# Patient Record
Sex: FEMALE | NOT HISPANIC OR LATINO | Employment: FULL TIME
[De-identification: names, ages, dates, MRNs, and addresses within clinical notes are randomized per-mention and may not be internally consistent; named-entity substitution may affect disease eponyms.]

---

## 2024-01-29 ENCOUNTER — TRANSCRIBE ORDERS (OUTPATIENT)
Dept: OTHER | Age: 44
End: 2024-01-29

## 2024-01-29 DIAGNOSIS — E66.01 MORBID OBESITY (H): Primary | ICD-10-CM

## 2024-02-07 ENCOUNTER — VIRTUAL VISIT (OUTPATIENT)
Dept: ENDOCRINOLOGY | Facility: CLINIC | Age: 44
End: 2024-02-07
Payer: COMMERCIAL

## 2024-02-07 VITALS — WEIGHT: 215 LBS | HEIGHT: 65 IN | BODY MASS INDEX: 35.82 KG/M2

## 2024-02-07 DIAGNOSIS — E66.01 MORBID OBESITY (H): ICD-10-CM

## 2024-02-07 DIAGNOSIS — E66.812 CLASS 2 SEVERE OBESITY WITH SERIOUS COMORBIDITY AND BODY MASS INDEX (BMI) OF 35.0 TO 35.9 IN ADULT, UNSPECIFIED OBESITY TYPE (H): Primary | ICD-10-CM

## 2024-02-07 DIAGNOSIS — E66.01 CLASS 2 SEVERE OBESITY WITH SERIOUS COMORBIDITY AND BODY MASS INDEX (BMI) OF 35.0 TO 35.9 IN ADULT, UNSPECIFIED OBESITY TYPE (H): Primary | ICD-10-CM

## 2024-02-07 DIAGNOSIS — Z98.84 S/P GASTRIC BYPASS: ICD-10-CM

## 2024-02-07 PROCEDURE — 99417 PROLNG OP E/M EACH 15 MIN: CPT | Mod: 95

## 2024-02-07 PROCEDURE — 99205 OFFICE O/P NEW HI 60 MIN: CPT | Mod: 95

## 2024-02-07 RX ORDER — LISINOPRIL 20 MG/1
20 TABLET ORAL DAILY
COMMUNITY

## 2024-02-07 RX ORDER — CHLORAL HYDRATE 500 MG
1000 CAPSULE ORAL DAILY
COMMUNITY

## 2024-02-07 RX ORDER — THIAMINE MONONITRATE (VIT B1) 100 MG
100 TABLET ORAL DAILY
COMMUNITY

## 2024-02-07 NOTE — PROGRESS NOTES
"109 minutes spent by me on the date of the encounter doing chart review, history and exam, documentation and further activities per the note    New Medical Weight Management Consult    PATIENT:  Becky Barrios  MRN:         [id number]  :         1980  MAGED:         2024    Dear Alba Sharma ,    I had the pleasure of seeing your patient, Becky Barrios. Full intake/assessment was done to determine barriers to weight loss success and develop a treatment plan. Becky Barrios is a 43 year old female interested in treatment of medical problems associated with excess weight. She has a height of 5' 5\", a weight of 215 lbs 0 oz, and the calculated Body mass index is 35.78 kg/m .            Assessment & Plan   Problem List Items Addressed This Visit       Class 2 severe obesity with serious comorbidity and body mass index (BMI) of 35.0 to 35.9 in adult, unspecified obesity type (H) - Primary    Relevant Medications    tirzepatide-Weight Management (ZEPBOUND) 2.5 MG/0.5ML prefilled pen    tirzepatide-Weight Management (ZEPBOUND) 5 MG/0.5ML prefilled pen (Start on 3/7/2024)    Other Relevant Orders    CBC with platelets    Comprehensive metabolic panel    Hemoglobin A1c    Lipid panel reflex to direct LDL Fasting    Parathyroid Hormone Intact    Vitamin A    Vitamin B12    Vitamin D Deficiency    S/P gastric bypass    Relevant Medications    tirzepatide-Weight Management (ZEPBOUND) 2.5 MG/0.5ML prefilled pen    tirzepatide-Weight Management (ZEPBOUND) 5 MG/0.5ML prefilled pen (Start on 3/7/2024)    Other Relevant Orders    CBC with platelets    Comprehensive metabolic panel    Hemoglobin A1c    Lipid panel reflex to direct LDL Fasting    Parathyroid Hormone Intact    Vitamin A    Vitamin B12    Vitamin D Deficiency     Other Visit Diagnoses       Morbid obesity (H)        Relevant Medications    tirzepatide-Weight Management (ZEPBOUND) 2.5 MG/0.5ML prefilled pen    tirzepatide-Weight " Management (ZEPBOUND) 5 MG/0.5ML prefilled pen (Start on 3/7/2024)           Overweight onset in high school, but saw more significant weight gain in college. Age 16 weighed approximately 155lbs. Throughout college gained  up to 200, continued to gain up to 255, which was highest weight in life.     Was diagnosed with type 2 diabetes in 2008, but blood sugar has not reached diabetes levels since her RYGB.   Had a RYGB in 2009 through [facility].  Ihsan weight after RYGB was 155 lbs. Has gradually regained this weight over the last 15 years, up to current weight of 215lbs which is her highest weight since surgery.     Regarding any issues post RYGB: Experiences loose stools after eating high carbohydrate meals since surgery, sometimes stools are loose even if the meal wasn't high in carbs or sugar.   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 She ate a piece of candy and felt better afterwards. Has experienced other episodes dizziness and fatigue after not eating for long stretches of time, has not checked her blood sugar, describes that none were worrisome to her aside from that one episode as an ED nurse.   Had one episode of severe abdominal pain after eating a very large amount of food 7-8 years ago that lead to a brief hospitalization and management through [facility] due to concern for a bowel obstruction , was monitored over night with close monitoring and was eventually able to go home without surgical intervention, the presumed diagnosis was related to food imaction. Aside from these aforementioned issues, has not seen any other issues related to the RYGB (denies dysphagia, nausea, abdominal pain).  Labs and post bariatric vitamins managed annually through PCP Alba Sharma in [place].     Aside from RYGB, at times has been able to lose 10lbs in the past with  increased exercise but this weight will come back once exercise stopped, Becky struggles to exercise lately due to frequent ankle pain.      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     Comorbidities associated with weight gain include high blood pressure, fatty liver, GERD (occasionally, managed as needed with tums), back pain, ankle pain, history of elevated cholesterol, history of type 2 diabetes diagnosis.     Motivators for weight loss include improve overall health, be able to live as long as possible to be around for her kids.     She is interested in starting a medication as a tool for working towards sustainable weight loss.    Regarding eating patterns and diet,  she typically eats 2 meals a day. Craves pasta and bread, experiences food noise throughout the day. Is able to get full. Can stay full. Eats out/ gets take out a few times a week. Drinks diet energy drink, coffee with splenda and cream, diet coke once a week.    First meal, 10:30 or 11:00 - will eat leftovers from supper, doughnuts in the office. Sometimes will eat a cheese stick. Typically eats alone, doesn't like people watching her eat.   Second meal around 5:00 - supper. Hamburger, chicken, fish, or steak; mashed potato, baked potatos, pasta; vegetables   After supper might have a bite of food but otherwise doesn't typically eat  Seldomly wakes up in the middle of the night to eat (maybe once every few months).     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 Has frequent ankle pain (foot surgery 20 years ago related to this), makes it harder to walk. Likes walking generally, hopes to take walk breaks at work as her work  encourages her to do this.         Past/ Current AOMs  Has taken wellbutrin in the past for mood, reports that she's tolerated it in the past and stopped when her mood was better.     Plan   Start Zepbound 2.5mg once weekly for 4 weeks, then increase to 5mg once weekly.  Alternatives discussed: topiramate, contrave, metformin. If Becky is able to get documentation showing she has a previous diagnosis of type 2 diabetes, could consider ozempic in the future   Goals we discussed today: going on walks in the middle of the day while at work   Labs ordered today: post rygb labs faxed to [facility]  Follow up with Adilene in 1 month to check in on starting zepbound    Dietician appointment to be scheduled asap        Anti-obesity medication started today for this patient   ZEPBOUND  No history of pancreatitis   No personal or family history of medullary thyroid carcinoma  No personal or family history of Multiple Endocrine Neoplasia Type 2  Will likely see the most benefit in managing cravings, food noise, helping with reducing snacking throughout the day   .     Potential anti-obesity medications for this patient the future if there are issues with cost or side effects  OZEMPIC  Was diagnosed with diabetes mellitus type 2 about 15 years ago, prior to RYGB. With documentation of this, could prescribe with this diagnosis.   No history of pancreatitis   No personal or family history of medullary thyroid carcinoma  No personal or family history of Multiple Endocrine Neoplasia Type 2  .  TOPIRAMATE  No history of kidney stones  No history of glaucoma   No issues with memory  No chronic kidney disease   .  CONTRAVE  No history of liver disease  No chronic pain  No history of bipolar disorder  No history of cardiovascular disease   No history of cardiac arrhythmias   No history of seizures  No history of bulimia nervosa  No history of anorexia nervosa  .  METFORMIN  Has taken in the past , does not recall adverse side  "effects   No history of chronic kidney disease  GFR >45  .    The following medications could be considered with caution for this patient   PHENTERMINE  No history of CVA   No history of cardiovascular disease   No history of cardiac arrhythmias   No history of glaucoma  No history of drug abuse  Would need documentation of optimally controlled blood pressure   .         Patient instructed that 2 forms of birth control are required with topiramate, discussed that these medications are not safe in pregnancy.           Becky Barrios is a 43 year old female who presents to clinic today for the following health issues.     She has the following co-morbidities:        2/7/2024     2:26 PM   --   I have the following health issues associated with obesity Pre-Diabetes    High Blood Pressure    High Cholesterol    GERD (Reflux)    Fatty Liver    Stress Incontinence   I have the following symptoms associated with obesity Knee Pain    Infertility (Difficulty Getting Pregnant)    Depression    Lower Extremity Swelling    Back Pain    Fatigue    Hip Pain    Irregular Menstral Cycle            No data to display                    2/7/2024     2:26 PM   Referring Provider   Please name the provider who referred you to Medical Weight Management  If you do not know, please answer \"I Don't Know\" Alba chavez           2/7/2024     2:26 PM   Weight History   How concerned are you about your weight? Very Concerned   I became overweight As a Teenager   The following factors have contributed to my weight gain Mental Health Issues    Change in Schedule    Eating Wrong Types of Food    Eating Too Much    Lack of Exercise    Genetic (Runs in the Family)    Stress   I have tried the following methods to lose weight Exercise    Slim Fast or Other Liquid Diets    Medications    Weight Loss Surgery    Meal Replacements   My lowest weight since age 16 was 165   My highest weight since age 16 was 275   The most weight I have ever " lost was (lbs) 100   I have the following family history of obesity/being overweight My mother is overweight    One or more of my siblings are overweight    Many of my relatives are overweight   How has your weight changed over the last year? Gained   How many pounds? 25           2/7/2024     2:26 PM   Diet Recall Review with Patient   If you do eat lunch, what types of food do you typically eat? Pasta bread   If you do eat supper, what types of food do you typically eat? Meat and sides   If you do snack, what types of food do you typically eat? Crackers fruit vegetable   How many glasses of juice do you drink in a typical day? 1   How many of glasses of milk do you drink in a typical day? 1   If you do drink milk, what type? 2%   How many 8oz glasses of sugar containing drinks such as Pierre-Aid/sweet tea do you drink in a day? 2   How many cans/bottles of sugar pop/soda/tea/sports drinks do you drink in a day? 0   How many cans/bottles of diet pop/soda/tea or sports drink do you drink in a day? 1   How often do you have a drink of alcohol? Monthly or Less   If you do drink, how many drinks might you have in a day? 1 or 2           2/7/2024     2:26 PM   Eating Habits   Generally, my meals include foods like these bread, pasta, rice, potatoes, corn, crackers, sweet dessert, pop, or juice Almost Everyday   Generally, my meals include foods like these fried meats, brats, burgers, french fries, pizza, cheese, chips, or ice cream A Few Times a Week   Eat fast food (like McDonalds, Burger Iraj, Taco Bell) Once a Week   Eat at a buffet or sit-down restaurant Never   Eat most of my meals in front of the TV or computer A Few Times a Week   Often skip meals, eat at random times, have no regular eating times A Few Times a Week   Rarely sit down for a meal but snack or graze throughout A Few Times a Week   Eat extra snacks between meals A Few Times a Week   Eat most of my food at the end of the day A Few Times a Week   Eat in  the middle of the night or wake up at night to eat Less Than Weekly   Eat extra snacks to prevent or correct low blood sugar A Few Times a Week   Eat to prevent acid reflux or stomach pain A Few Times a Week   Worry about not having enough food to eat Never   I eat when I am depressed Almost Everyday   I eat when I am stressed Almost Everyday   I eat when I am bored Almost Everyday   I eat when I am anxious Almost Everyday   I eat when I am happy or as a reward Never   I feel hungry all the time even if I just have eaten Never   Feeling full is important to me Never   I finish all the food on my plate even if I am already full Less Than Weekly   I can't resist eating delicious food or walk past the good food/smell Less Than Weekly   I eat/snack without noticing that I am eating Once a Week   I eat when I am preparing the meal Less Than Weekly   I eat more than usual when I see others eating Less Than Weekly   I have trouble not eating sweets, ice cream, cookies, or chips if they are around the house Never   I think about food all day Less Than Weekly   What foods, if any, do you crave? Chips/Crackers           2/7/2024     2:26 PM   Amount of Food   I feel out of control when eating Weekly   I eat a large amount of food, like a loaf of bread, a box of cookies, a pint/quart of ice cream, all at once Weekly   I eat a large amount of food even when I am not hungry Weekly   I eat rapidly Weekly   I eat alone because I feel embarrassed and do not want others to see how much I have eaten Everyday   I eat until I am uncomfortably full Weekly   I feel bad, disgusted, or guilty after I overeat Weekly   Has had episodes in the past of overeating but since the one episode of overeating causing enough abdominal pain to cause her to be hospitalized (see HPI), but since then has been able to avoid overeating due to concern for causing that pain.         2/7/2024     2:26 PM   Activity/Exercise History   How much of a typical 12  hour day do you spend sitting? Half the Day   How much of a typical 12 hour day do you spend lying down? Less Than Half the Day   How much of a typical day do you spend walking/standing? Less Than Half the Day   How many hours (not including work) do you spend on the TV/Video Games/Computer/Tablet/Phone? 1 Hour or Less   How many times a week are you active for the purpose of exercise? 4-5 TImes a Week   What keeps you from being more active? Shortness of Breath    Lack of Time    Too tired   How many total minutes do you spend doing some activity for the purpose of exercising when you exercise? 15-30 Minutes       PAST MEDICAL HISTORY:  No past medical history on file.        2/7/2024     2:26 PM   Work/Social History Reviewed With Patient   My employment status is Full-Time   My job is Nurse   How much of your job is spent on the computer or phone? 75%   How many hours do you spend commuting to work daily? 1   What is your marital status? Single   If you have children, are they overweight? Yes   Who do you live with? Children   Who does the food shopping? I do       Marijuana use: none   Alcohol use: 2-4 times a month (2-4 beers in a sitting), with going out to dinner or in social events   Caffeine use energy drink and coffee daily             2/7/2024     2:26 PM   Mental Health History Reviewed With Patient   Have you ever been physically or sexually abused? No   How often in the past 2 weeks have you felt little interest or pleasure in doing things? For Several Days   Over the past 2 weeks how often have you felt down, depressed, or hopeless? For Several Days             2/7/2024     2:26 PM   Sleep History Reviewed With Patient   How many hours do you sleep at night? 7         MEDICATIONS:   Current Outpatient Medications   Medication Sig Dispense Refill    lisinopril (ZESTRIL) 20 MG tablet Take 20 mg by mouth daily      tirzepatide-Weight Management (ZEPBOUND) 2.5 MG/0.5ML prefilled pen Inject 0.5 mLs (2.5  "mg) Subcutaneous every 7 days For 4 weeks 2 mL 0    [START ON 3/7/2024] tirzepatide-Weight Management (ZEPBOUND) 5 MG/0.5ML prefilled pen Inject 0.5 mLs (5 mg) Subcutaneous every 7 days After completing 4 weeks of taking the 2.5mg dose 2 mL 2    FERROUS FUMARATE-VITAMIN C PO Take 29 mg by mouth daily      fish oil-omega-3 fatty acids 1000 MG capsule Take 1,000 mg by mouth daily      Multiple Vitamins-Minerals (OCUVITE-LUTEIN PO) Take 1 tablet by mouth 2 times daily      Thiamine Mononitrate (VITAMIN B-1) 100 MG TABS Take 100 mg by mouth daily      vitamin B-12 (CYANOCOBALAMIN) 100 MCG tablet Take 100 mcg by mouth daily             ALLERGIES:   No Known Allergies         No data to display                        Objective    Ht 1.651 m (5' 5\")   Wt 97.5 kg (215 lb)   BMI 35.78 kg/m           Vitals:  No vitals were obtained today due to virtual visit.    Physical Exam   GENERAL: alert and no distress  EYES: Eyes grossly normal to inspection.  No discharge or erythema, or obvious scleral/conjunctival abnormalities.  RESP: No audible wheeze, cough, or visible cyanosis.    SKIN: Visible skin clear. No significant rash, abnormal pigmentation or lesions.  NEURO: Cranial nerves grossly intact.  Mentation and speech appropriate for age.  PSYCH: Appropriate affect, tone, and pace of words     Anti-obesity medication ROS:    HEENT  Hx of glaucoma: No    Cardiovascular  CAD:No  HTN:Yes      Gastrointestinal  GERD:Yes seldom, managed with tums   Constipation:No  Liver Dz:Yes fatty liver   H/O Pancreatitis:No    Psychiatric  Bipolar: No  Anxiety:Yes  Depression:Yes more like adjustment disorder   History of alcohol/drug abuse: No  Hx of eating disorder:No    Endocrine  Personal or family hx of MTC or MEN2:No  Diabetes/prediabetes: Yes has been diagnosed with diabetes 15 years ago     Neurologic:  Hx of seizures: No  Hx of migraines: No  Memory Impairment: No  CVA history: No      History of kidney stones: No  Kidney disease: " No  Current birth control: Yes perimenopausal, menstrual cycle has been irregular   Interested in future pregnancy: No    Taking Opioid/Narcotic: No        Sincerely,    Adilene Doran PA-C

## 2024-02-07 NOTE — PROGRESS NOTES
Virtual Visit Check-In    During this virtual visit the patient is located in [place], patient verifies this as the location during the entirety of this visit.     Becky is a 43 year old who is being evaluated via a billable video visit.      How would you like to obtain your AVS? MyChart  If the video visit is dropped, the invitation should be resent by: Text to cell phone: [phone]  Will anyone else be joining your video visit? No        Video-Visit Details    Type of service:  Video Visit     Originating Location (pt. Location): Other parking lot     Distant Location (provider location):  Off-site  Platform used for Video Visit: [employer]         Video Start Time:  2:30pm  Video End Time: 3:25pm    Odalys Conrad, EMT

## 2024-02-07 NOTE — LETTER
"2024       RE: Becky Barrios  [address]  [place] [address]     Dear Colleague,    Thank you for referring your patient, Becky Barrios, to the [facility] [place] [facility]. Please see a copy of my visit note below.    Virtual Visit Check-In    During this virtual visit the patient is located in [place], patient verifies this as the location during the entirety of this visit.     Becky is a 43 year old who is being evaluated via a billable video visit.      How would you like to obtain your AVS? MyChart  If the video visit is dropped, the invitation should be resent by: Text to cell phone: [phone]  Will anyone else be joining your video visit? No        Video-Visit Details    Type of service:  Video Visit     Originating Location (pt. Location): Other parking lot     Distant Location (provider location):  Off-site  Platform used for Video Visit: Zixi         Video Start Time:  2:30pm  Video End Time: 3:25pm    YUSRA Guerrero                109 minutes spent by me on the date of the encounter doing chart review, history and exam, documentation and further activities per the note    New Medical Weight Management Consult    PATIENT:  Becky Barrios  MRN:         [id number]  :         1980  MAGED:         2024    Dear Alba Sharma ,    I had the pleasure of seeing your patient, Becky Barrios. Full intake/assessment was done to determine barriers to weight loss success and develop a treatment plan. Becky Barrios is a 43 year old female interested in treatment of medical problems associated with excess weight. She has a height of 5' 5\", a weight of 215 lbs 0 oz, and the calculated Body mass index is 35.78 kg/m .            Assessment & Plan  Problem List Items Addressed This Visit       Class 2 severe obesity with serious comorbidity and body mass index (BMI) of 35.0 to " 35.9 in adult, unspecified obesity type (H) - Primary    Relevant Medications    tirzepatide-Weight Management (ZEPBOUND) 2.5 MG/0.5ML prefilled pen    tirzepatide-Weight Management (ZEPBOUND) 5 MG/0.5ML prefilled pen (Start on 3/7/2024)    Other Relevant Orders    CBC with platelets    Comprehensive metabolic panel    Hemoglobin A1c    Lipid panel reflex to direct LDL Fasting    Parathyroid Hormone Intact    Vitamin A    Vitamin B12    Vitamin D Deficiency    S/P gastric bypass    Relevant Medications    tirzepatide-Weight Management (ZEPBOUND) 2.5 MG/0.5ML prefilled pen    tirzepatide-Weight Management (ZEPBOUND) 5 MG/0.5ML prefilled pen (Start on 3/7/2024)    Other Relevant Orders    CBC with platelets    Comprehensive metabolic panel    Hemoglobin A1c    Lipid panel reflex to direct LDL Fasting    Parathyroid Hormone Intact    Vitamin A    Vitamin B12    Vitamin D Deficiency     Other Visit Diagnoses       Morbid obesity (H)        Relevant Medications    tirzepatide-Weight Management (ZEPBOUND) 2.5 MG/0.5ML prefilled pen    tirzepatide-Weight Management (ZEPBOUND) 5 MG/0.5ML prefilled pen (Start on 3/7/2024)           Overweight onset in high school, but saw more significant weight gain in college. Age 16 weighed approximately 155lbs. Throughout college gained  up to 200, continued to gain up to 255, which was highest weight in life.     Was diagnosed with type 2 diabetes in 2008, but blood sugar has not reached diabetes levels since her RYGB.   Had a RYGB in 2009 through [place].  Ihsan weight after RYGB was 155 lbs. Has gradually regained this weight over the last 15 years, up to current weight of 215lbs which is her highest weight since surgery.     Regarding any issues post RYGB: Experiences loose stools after eating high carbohydrate meals since surgery, sometimes stools are loose even if the meal wasn't high in carbs or sugar.   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 She ate a piece of candy and felt better afterwards. Has experienced other episodes dizziness and fatigue after not eating for long stretches of time, has not checked her blood sugar, describes that none were worrisome to her aside from that one episode as an ED nurse.   Had one episode of severe abdominal pain after eating a very large amount of food 7-8 years ago that lead to a brief hospitalization and management through [facility] due to concern for a bowel obstruction , was monitored over night with close monitoring and was eventually able to go home without surgical intervention, the presumed diagnosis was related to food imaction. Aside from these aforementioned issues, has not seen any other issues related to the RYGB (denies dysphagia, nausea, abdominal pain).  Labs and post bariatric vitamins managed annually through PCP Alba Sharma in [place].     Aside from RYGB, at times has been able to lose 10lbs in the past with increased exercise but this weight will come back once exercise stopped, Crystal struggles to exercise lately due to frequent ankle pain.      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     Comorbidities associated with weight gain include high blood pressure, fatty liver, GERD (occasionally, managed as needed with tums), back pain, ankle pain, history of elevated cholesterol, history of type 2 diabetes diagnosis.     Motivators for weight loss include improve overall health, be able to live as long as possible to be around for her kids.     She is interested in starting a medication as a tool for working towards sustainable weight  loss.    Regarding eating patterns and diet,  she typically eats 2 meals a day. Craves pasta and bread, experiences food noise throughout the day. Is able to get full. Can stay full. Eats out/ gets take out a few times a week. Drinks diet energy drink, coffee with splenda and cream, diet coke once a week.    First meal, 10:30 or 11:00 - will eat leftovers from supper, doughnuts in the office. Sometimes will eat a cheese stick. Typically eats alone, doesn't like people watching her eat.   Second meal around 5:00 - supper. Hamburger, chicken, fish, or steak; mashed potato, baked potatos, pasta; vegetables   After supper might have a bite of food but otherwise doesn't typically eat  Seldomly wakes up in the middle of the night to eat (maybe once every few months).     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 Has frequent ankle pain (foot surgery 20 years ago related to this), makes it harder to walk. Likes walking generally, hopes to take walk breaks at work as her work encourages her to do this.         Past/ Current AOMs  Has taken wellbutrin in the past for mood, reports that she's tolerated it in the past and stopped when her mood was better.     Plan   Start Zepbound 2.5mg once weekly for 4 weeks, then increase to 5mg once weekly.  Alternatives discussed: topiramate, contrave, metformin. If Becky is able to get documentation showing she has a previous diagnosis of type 2 diabetes, could consider ozempic in the future   Goals we discussed today: going on walks in the middle of the day while at work   Labs ordered today: post rygb labs faxed to [facility]  Follow up with Adilene in 1 month to check in on starting zepbound    Dietician appointment to be scheduled asap        Anti-obesity medication started today for this patient   ZEPBOUND  No history of pancreatitis   No personal or family history of medullary thyroid  carcinoma  No personal or family history of Multiple Endocrine Neoplasia Type 2  Will likely see the most benefit in managing cravings, food noise, helping with reducing snacking throughout the day   .     Potential anti-obesity medications for this patient the future if there are issues with cost or side effects  OZEMPIC  Was diagnosed with diabetes mellitus type 2 about 15 years ago, prior to RYGB. With documentation of this, could prescribe with this diagnosis.   No history of pancreatitis   No personal or family history of medullary thyroid carcinoma  No personal or family history of Multiple Endocrine Neoplasia Type 2  .  TOPIRAMATE  No history of kidney stones  No history of glaucoma   No issues with memory  No chronic kidney disease   .  CONTRAVE  No history of liver disease  No chronic pain  No history of bipolar disorder  No history of cardiovascular disease   No history of cardiac arrhythmias   No history of seizures  No history of bulimia nervosa  No history of anorexia nervosa  .  METFORMIN  Has taken in the past , does not recall adverse side effects   No history of chronic kidney disease  GFR >45  .    The following medications could be considered with caution for this patient   PHENTERMINE  No history of CVA   No history of cardiovascular disease   No history of cardiac arrhythmias   No history of glaucoma  No history of drug abuse  Would need documentation of optimally controlled blood pressure   .         Patient instructed that 2 forms of birth control are required with topiramate, discussed that these medications are not safe in pregnancy.           Becky Barrios is a 43 year old female who presents to clinic today for the following health issues.     She has the following co-morbidities:        2/7/2024     2:26 PM   --   I have the following health issues associated with obesity Pre-Diabetes    High Blood Pressure    High Cholesterol    GERD (Reflux)    Fatty Liver    Stress Incontinence  "  I have the following symptoms associated with obesity Knee Pain    Infertility (Difficulty Getting Pregnant)    Depression    Lower Extremity Swelling    Back Pain    Fatigue    Hip Pain    Irregular Menstral Cycle            No data to display                    2/7/2024     2:26 PM   Referring Provider   Please name the provider who referred you to Medical Weight Management  If you do not know, please answer \"I Don't Know\" Alba chavez           2/7/2024     2:26 PM   Weight History   How concerned are you about your weight? Very Concerned   I became overweight As a Teenager   The following factors have contributed to my weight gain Mental Health Issues    Change in Schedule    Eating Wrong Types of Food    Eating Too Much    Lack of Exercise    Genetic (Runs in the Family)    Stress   I have tried the following methods to lose weight Exercise    Slim Fast or Other Liquid Diets    Medications    Weight Loss Surgery    Meal Replacements   My lowest weight since age 16 was 165   My highest weight since age 16 was 275   The most weight I have ever lost was (lbs) 100   I have the following family history of obesity/being overweight My mother is overweight    One or more of my siblings are overweight    Many of my relatives are overweight   How has your weight changed over the last year? Gained   How many pounds? 25           2/7/2024     2:26 PM   Diet Recall Review with Patient   If you do eat lunch, what types of food do you typically eat? Pasta bread   If you do eat supper, what types of food do you typically eat? Meat and sides   If you do snack, what types of food do you typically eat? Crackers fruit vegetable   How many glasses of juice do you drink in a typical day? 1   How many of glasses of milk do you drink in a typical day? 1   If you do drink milk, what type? 2%   How many 8oz glasses of sugar containing drinks such as Pierre-A[employer]/sweet tea do you drink in a day? 2   How many cans/bottles of sugar " pop/soda/tea/sports drinks do you drink in a day? 0   How many cans/bottles of diet pop/soda/tea or sports drink do you drink in a day? 1   How often do you have a drink of alcohol? Monthly or Less   If you do drink, how many drinks might you have in a day? 1 or 2           2/7/2024     2:26 PM   Eating Habits   Generally, my meals include foods like these bread, pasta, rice, potatoes, corn, crackers, sweet dessert, pop, or juice Almost Everyday   Generally, my meals include foods like these fried meats, brats, burgers, french fries, pizza, cheese, chips, or ice cream A Few Times a Week   Eat fast food (like [employer], [employer], [employer]) Once a Week   Eat at a buffet or sit-down restaurant Never   Eat most of my meals in front of the TV or computer A Few Times a Week   Often skip meals, eat at random times, have no regular eating times A Few Times a Week   Rarely sit down for a meal but snack or graze throughout A Few Times a Week   Eat extra snacks between meals A Few Times a Week   Eat most of my food at the end of the day A Few Times a Week   Eat in the middle of the night or wake up at night to eat Less Than Weekly   Eat extra snacks to prevent or correct low blood sugar A Few Times a Week   Eat to prevent acid reflux or stomach pain A Few Times a Week   Worry about not having enough food to eat Never   I eat when I am depressed Almost Everyday   I eat when I am stressed Almost Everyday   I eat when I am bored Almost Everyday   I eat when I am anxious Almost Everyday   I eat when I am happy or as a reward Never   I feel hungry all the time even if I just have eaten Never   Feeling full is important to me Never   I finish all the food on my plate even if I am already full Less Than Weekly   I can't resist eating delicious food or walk past the good food/smell Less Than Weekly   I eat/snack without noticing that I am eating Once a Week   I eat when I am preparing the meal Less Than Weekly   I eat more than  usual when I see others eating Less Than Weekly   I have trouble not eating sweets, ice cream, cookies, or chips if they are around the house Never   I think about food all day Less Than Weekly   What foods, if any, do you crave? Chips/Crackers           2/7/2024     2:26 PM   Amount of Food   I feel out of control when eating Weekly   I eat a large amount of food, like a loaf of bread, a box of cookies, a pint/quart of ice cream, all at once Weekly   I eat a large amount of food even when I am not hungry Weekly   I eat rapidly Weekly   I eat alone because I feel embarrassed and do not want others to see how much I have eaten Everyday   I eat until I am uncomfortably full Weekly   I feel bad, disgusted, or guilty after I overeat Weekly   Has had episodes in the past of overeating but since the one episode of overeating causing enough abdominal pain to cause her to be hospitalized (see HPI), but since then has been able to avoid overeating due to concern for causing that pain.         2/7/2024     2:26 PM   Activity/Exercise History   How much of a typical 12 hour day do you spend sitting? Half the Day   How much of a typical 12 hour day do you spend lying down? Less Than Half the Day   How much of a typical day do you spend walking/standing? Less Than Half the Day   How many hours (not including work) do you spend on the TV/Video Games/Computer/Tablet/Phone? 1 Hour or Less   How many times a week are you active for the purpose of exercise? 4-5 TImes a Week   What keeps you from being more active? Shortness of Breath    Lack of Time    Too tired   How many total minutes do you spend doing some activity for the purpose of exercising when you exercise? 15-30 Minutes       PAST MEDICAL HISTORY:  No past medical history on file.        2/7/2024     2:26 PM   Work/Social History Reviewed With Patient   My employment status is Full-Time   My job is Nurse   How much of your job is spent on the computer or phone? 75%   How  "many hours do you spend commuting to work daily? 1   What is your marital status? Single   If you have children, are they overweight? Yes   Who do you live with? Children   Who does the food shopping? I do       Marijuana use: none   Alcohol use: 2-4 times a month (2-4 beers in a sitting), with going out to dinner or in social events   Caffeine use energy drink and coffee daily             2/7/2024     2:26 PM   Mental Health History Reviewed With Patient   Have you ever been physically or sexually abused? No   How often in the past 2 weeks have you felt little interest or pleasure in doing things? For Several Days   Over the past 2 weeks how often have you felt down, depressed, or hopeless? For Several Days             2/7/2024     2:26 PM   Sleep History Reviewed With Patient   How many hours do you sleep at night? 7         MEDICATIONS:   Current Outpatient Medications   Medication Sig Dispense Refill    lisinopril (ZESTRIL) 20 MG tablet Take 20 mg by mouth daily      tirzepatide-Weight Management (ZEPBOUND) 2.5 MG/0.5ML prefilled pen Inject 0.5 mLs (2.5 mg) Subcutaneous every 7 days For 4 weeks 2 mL 0    [START ON 3/7/2024] tirzepatide-Weight Management (ZEPBOUND) 5 MG/0.5ML prefilled pen Inject 0.5 mLs (5 mg) Subcutaneous every 7 days After completing 4 weeks of taking the 2.5mg dose 2 mL 2    FERROUS FUMARATE-VITAMIN C PO Take 29 mg by mouth daily      fish oil-omega-3 fatty acids 1000 MG capsule Take 1,000 mg by mouth daily      Multiple Vitamins-Minerals (OCUVITE-LUTEIN PO) Take 1 tablet by mouth 2 times daily      Thiamine Mononitrate (VITAMIN B-1) 100 MG TABS Take 100 mg by mouth daily      vitamin B-12 (CYANOCOBALAMIN) 100 MCG tablet Take 100 mcg by mouth daily             ALLERGIES:   No Known Allergies         No data to display                        Objective   Ht 1.651 m (5' 5\")   Wt 97.5 kg (215 lb)   BMI 35.78 kg/m           Vitals:  No vitals were obtained today due to virtual visit.    Physical " Exam   GENERAL: alert and no distress  EYES: Eyes grossly normal to inspection.  No discharge or erythema, or obvious scleral/conjunctival abnormalities.  RESP: No audible wheeze, cough, or visible cyanosis.    SKIN: Visible skin clear. No significant rash, abnormal pigmentation or lesions.  NEURO: Cranial nerves grossly intact.  Mentation and speech appropriate for age.  PSYCH: Appropriate affect, tone, and pace of words     Anti-obesity medication ROS:    HEENT  Hx of glaucoma: No    Cardiovascular  CAD:No  HTN:Yes      Gastrointestinal  GERD:Yes seldom, managed with tums   Constipation:No  Liver Dz:Yes fatty liver   H/O Pancreatitis:No    Psychiatric  Bipolar: No  Anxiety:Yes  Depression:Yes more like adjustment disorder   History of alcohol/drug abuse: No  Hx of eating disorder:No    Endocrine  Personal or family hx of MTC or MEN2:No  Diabetes/prediabetes: Yes has been diagnosed with diabetes 15 years ago     Neurologic:  Hx of seizures: No  Hx of migraines: No  Memory Impairment: No  CVA history: No      History of kidney stones: No  Kidney disease: No  Current birth control: Yes perimenopausal, menstrual cycle has been irregular   Interested in future pregnancy: No    Taking Opioid/Narcotic: No        Sincerely,    Adilene Doran PA-C

## 2024-02-07 NOTE — PATIENT INSTRUCTIONS
"Thank you for allowing us the privilege of caring for you. We hope we provided you with the excellent service you deserve.   Please let us know if there is anything else we can do for you so that we can be sure you are completely satisfied with your care experience.    To ensure the quality of our services you may be receiving a patient satisfaction survey from an independent patient satisfaction monitoring company.    The greatest compliment you can give is a \"Likely to Recommend\"    Your visit was with Adilene Doran PA-C today.    Instructions per today's visit:     Hi Becky Silvestre Denis, it was great to visit with you today.  Here is a review of our visit.  If our clinic scheduler is not able to reach you please call [phone] to schedule your next appointments.    Plan   Start Zepbound 2.5mg once weekly for 4 weeks, then increase to 5mg once weekly.  Alternatives discussed: topiramate, contrave, metformin. If Becky is able to get documentation showing she has a previous diagnosis of type 2 diabetes, could consider ozempic in the future   Goals we discussed today: going on walks in the middle of the day while at work   Labs ordered today: post rygb labs faxed to [facility]  Follow up with Adilene in 1 month to check in on starting zepbound    Dietician appointment to be scheduled asap      Information about Video Visits with [facility]: video visit information  _________________________________________________________________________________________________________________________________________________________  If you are asked by your clinic team to have your blood pressure checked:  [facility] do offer several locations for blood pressure checks. Please follow the below link to schedule an appointment. Scheduling an appointment at the pharmacy for a blood pressure check is now preferred.    Appointment Plus " ([email or web address])  _________________________________________________________________________________________________________________________________________________________  Important contact and scheduling information:  Please call our contact center at [phone] to schedule your next appointments.  To find a lab location near you, please call [phone].  For any nursing questions or concerns call Sofia Escobar LPN at [phone] or Jenni Richter RN at [phone]  Please call during clinic hours Monday through Friday 8:00a - 4:00p if you have questions or you can contact us via Jobspothart at anytime and we will reply during clinic hours.    Lab results will be communicated through My Chart or letter (if My Chart not used). Please call the clinic if you have not received communication after 1 week or if you have any questions.?  Clinic Fax: [phone]    _________________________________________________________________________________________________________________________________________________________  Meal Replacement Products:    Here is the link to our new e-store where you can purchase our meal replacement products    [facility] E-Store  [email or web address]    The one week starter kit is a great way to sample a variety of products and see what works for you.    If you want more information about the product go to: [employer] Meals  [email or web address]    If you are an employee or [facility] or [facility] please contact your care team for a 10% estore discount    Free Shipping for orders over $75     Benefits of meal replacements products:    Portion and calorie control  Improved nutrition  Structured eating  Simplified food choices  Avoid contact with trigger foods  _________________________________________________________________________________________________________________________________________________________  Interested in working with a health  ?  Health coaches work with you to improve your overall health and wellbeing.  They look at the whole person, and may involve discussion of different areas of life, including, but not limited to the four pillars of health (sleep, exercise, nutrition, and stress management). Discuss with your care team if you would like to start working a health .  Health Coaching-3 Pack: Schedule by calling [phone]    $99 for three health coaching visits    Visits may be done in person or via phone    Coaching is a partnership between the  and the client; Coaches do not prescribe or diagnose    Coaching helps inspire the client to reach his/her personal goals   _________________________________________________________________________________________________________________________________________________________  24 Week Healthy Lifestyle Plan:    Our mission in the 24-week Healthy Lifestyle Plan is to provide you with individualized care by giving you the tools, education and support you need to lose weight and maintain a healthy lifestyle. In your 24-week journey, you ll be supported by a dedicated weight loss team that includes registered dietitians, medical weight management providers, health coaches, and nurses -- all with special expertise in weight loss -- to help you every step of the way.     Monthly meetings with your registered dietician or medical weight management provider help to review your progress, update your care plan, and make any adjustments needed to ensure success. Between these visits, weekly and bi-weekly health  visits will help you focus on the four pillars of weight loss -- stress, sleep, nutrition, and exercise -- and how you can best adapt each to achieve sustainable weight loss results.    In addition, you will be given exclusive access to online wellbeing classes through [employer].  Your initial visit will be with a medical weight management provider who will help to  understand your weight loss goals and ensure this program is the right fit for you. Please let our team know if you are interested in the 24 week plan by sending a message to your care team or calling [phone] to schedule.  _________________________________________________________________________________________________________________________________________________________  __________  [place] [facility] Get Moving Program  Our team of physical therapists is trained to help you understand and take control of your condition. They will perform a thorough evaluation to determine your ability for activity and develop a customized plan to fit your goals and physical ability.  Scheduling: Unsure if the Get Moving program is right for you? Discuss the program with your medical provider or diabetes educator. You can also call us at [phone] to ask questions or schedule an appointment.   [facility] Get Moving Program  ____________________________________________________________________________________________________________________________________________________________________________  [facility] Diabetes Prevention Program (DPP)  If you have prediabetes and Medicare please contact us via Freeppie to learn more about the Diabetes Prevention Program (DPP)  Program Details:   [facility] offers the year-long Diabetes Prevention Program (DPP). The program helps you to make lifestyle changes that prevent or delay type 2 diabetes by supporting healthy eating, increased physical activity, stress reduction and use of coping skills.   On average, previous [facility] DPP cohorts have lost and maintained at least 5% of their starting weight throughout the program and averaged more than 150 minutes of physical activity per week.  Participants meet weekly for one-hour group sessions over sixteen weeks, every other week for the next 8 weeks, and monthly for the last six months.   A year-long  maintenance program is also available for participants who complete the first year.   Location & Cost:   During the COVID-19 Public Health Emergency, the program is offered virtually. When in-person classes can resume, they will be held at [facility].  For people with Medicare, the program is covered in full. A self-pay option will also be available for those with non-Medicare insurance plans.   ______________________________________________________________________________________________________________________________________________________________________________________________________________________________    To work with a Behavioral Health Psychologist:    Call to schedule:    Tapan Moya - [phone]  Nolan Obrien - [phone]  Donna Garrison - [phone]  Trish Yost - [phone]   Kristi Hein PhD (cannot accept Medicare) [phone]        Thank you,   [facility] Comprehensive Weight Management Team

## 2024-02-07 NOTE — NURSING NOTE
"Chief Complaint   Patient presents with    Consult     New consultation for weight management.         Vitals:    02/07/24 1410   Weight: 215 lb   Height: 5' 5\"       Body mass index is 35.78 kg/m .      Odalys Garcia, EMT  Sur[facility] Clinic                      "

## 2024-02-09 ENCOUNTER — TELEPHONE (OUTPATIENT)
Dept: ENDOCRINOLOGY | Facility: CLINIC | Age: 44
End: 2024-02-09
Payer: COMMERCIAL

## 2024-02-09 NOTE — TELEPHONE ENCOUNTER
PA Initiation Key: [id number]    Medication: ZEPBOUND 2.5 MG/0.5ML SC SOAJ  Insurance Company: "[employer] - Phone [phone] Fax [phone]  Pharmacy Filling the Rx: [facility] - [place], [place] - [address]  Filling Pharmacy Phone: [phone]  Filling Pharmacy Fax: [phone]  Start Date: 2/7/2024   Received:    Called and talked with P.A. Phone rep, should have outcome in 24 hours

## 2024-02-10 NOTE — TELEPHONE ENCOUNTER
Prior Authorization Approval    Medication: ZEPBOUND 2.5 MG/0.5ML SC SOAJ  Authorization Effective Date: 1/10/2024  Authorization Expiration Date: 8/7/2024  Approved Dose/Quantity:    Reference #: Key: [id number]   Insurance Company: [employer] - Phone [phone] Fax [phone]  Expected CoPay: $    CoPay Card Available: No    Financial Assistance Needed:    Which Pharmacy is filling the prescription: [facility] - [place], [place] - [address]  Pharmacy Notified: Y  Patient Notified: Y

## 2024-03-10 ENCOUNTER — HEALTH MAINTENANCE LETTER (OUTPATIENT)
Age: 44
End: 2024-03-10

## 2024-03-25 ENCOUNTER — TRANSFERRED RECORDS (OUTPATIENT)
Dept: HEALTH INFORMATION MANAGEMENT | Facility: CLINIC | Age: 44
End: 2024-03-25

## 2024-04-01 DIAGNOSIS — E66.812 CLASS 2 SEVERE OBESITY WITH SERIOUS COMORBIDITY AND BODY MASS INDEX (BMI) OF 35.0 TO 35.9 IN ADULT, UNSPECIFIED OBESITY TYPE (H): Primary | ICD-10-CM

## 2024-04-01 DIAGNOSIS — E66.01 CLASS 2 SEVERE OBESITY WITH SERIOUS COMORBIDITY AND BODY MASS INDEX (BMI) OF 35.0 TO 35.9 IN ADULT, UNSPECIFIED OBESITY TYPE (H): Primary | ICD-10-CM

## 2024-04-01 DIAGNOSIS — Z98.84 S/P GASTRIC BYPASS: ICD-10-CM

## 2024-04-01 RX ORDER — SEMAGLUTIDE 0.25 MG/.5ML
0.25 INJECTION, SOLUTION SUBCUTANEOUS WEEKLY
Qty: 2 ML | Refills: 0 | Status: SHIPPED | OUTPATIENT
Start: 2024-04-01

## 2024-04-01 RX ORDER — SEMAGLUTIDE 0.5 MG/.5ML
0.5 INJECTION, SOLUTION SUBCUTANEOUS WEEKLY
Qty: 2 ML | Refills: 0 | Status: SHIPPED | OUTPATIENT
Start: 2024-04-01

## 2024-04-12 ENCOUNTER — TELEPHONE (OUTPATIENT)
Dept: ENDOCRINOLOGY | Facility: CLINIC | Age: 44
End: 2024-04-12
Payer: COMMERCIAL

## 2024-04-12 NOTE — TELEPHONE ENCOUNTER
PA Initiation Key: [id number]    Medication: WEGOVY 0.25 MG/0.5ML SC SOAJ  Insurance Company: [facility] - Phone [phone] Fax [phone]  Pharmacy Filling the Rx: [facility] - [place], [place] - [facility] [address]  Filling Pharmacy Phone: [phone]  Filling Pharmacy Fax: [phone]  Start Date: 4/10/2024

## 2024-04-12 NOTE — TELEPHONE ENCOUNTER
Prior Authorization Approval    Medication: WEGOVY 0.25 MG/0.5ML SC SOAJ  Authorization Effective Date: 3/12/2024  Authorization Expiration Date: 10/8/2024  Approved Dose/Quantity:    Reference #: Key: [id number]   Insurance Company: [employer] [place] - Phone [phone] Fax [phone]  Expected CoPay: $    CoPay Card Available: No    Financial Assistance Needed:    Which Pharmacy is filling the prescription: [facility] - [place], [place] - [address]  Pharmacy Notified: Y  Patient Notified: Y

## 2024-04-26 ENCOUNTER — TELEPHONE (OUTPATIENT)
Dept: ENDOCRINOLOGY | Facility: CLINIC | Age: 44
End: 2024-04-26
Payer: COMMERCIAL

## 2024-04-30 ENCOUNTER — VIRTUAL VISIT (OUTPATIENT)
Dept: ENDOCRINOLOGY | Facility: CLINIC | Age: 44
End: 2024-04-30
Payer: COMMERCIAL

## 2024-04-30 VITALS — HEIGHT: 65 IN | BODY MASS INDEX: 35.24 KG/M2 | WEIGHT: 211.5 LBS

## 2024-04-30 DIAGNOSIS — Z98.84 HISTORY OF ROUX-EN-Y GASTRIC BYPASS: ICD-10-CM

## 2024-04-30 DIAGNOSIS — E66.01 CLASS 2 SEVERE OBESITY WITH SERIOUS COMORBIDITY AND BODY MASS INDEX (BMI) OF 35.0 TO 35.9 IN ADULT, UNSPECIFIED OBESITY TYPE (H): Primary | ICD-10-CM

## 2024-04-30 DIAGNOSIS — E66.812 CLASS 2 SEVERE OBESITY WITH SERIOUS COMORBIDITY AND BODY MASS INDEX (BMI) OF 35.0 TO 35.9 IN ADULT, UNSPECIFIED OBESITY TYPE (H): Primary | ICD-10-CM

## 2024-04-30 PROCEDURE — 99214 OFFICE O/P EST MOD 30 MIN: CPT | Mod: 95

## 2024-04-30 ASSESSMENT — PAIN SCALES - GENERAL: PAINLEVEL: NO PAIN (1)

## 2024-04-30 NOTE — PROGRESS NOTES
Virtual Visit Details    Type of service:  Video Visit     Originating Location (pt. Location): Home    Distant Location (provider location):  Off-site  Platform used for Video Visit: Piper

## 2024-04-30 NOTE — PROGRESS NOTES
Return Bariatric Surgery Note    RE: Becky Barrios  MR#: [id number]  : 1980  VISIT DATE: 2024      Dear Alba Sharma,    I had the pleasure of seeing your patient, Becky Barrios, in my post-bariatric surgery assessment clinic.    Assessment & Plan   Problem List Items Addressed This Visit       Class 2 severe obesity with serious comorbidity and body mass index (BMI) of 35.0 to 35.9 in adult, unspecified obesity type (H) - Primary    History of Pacheco-en-Y gastric bypass          31 minutes spent by me on the date of the encounter doing chart review, history and exam, documentation and further activities per the note    CHIEF COMPLAINT: Post-bariatric surgery follow-up. RYGB in  with subsequent weight gain.     HISTORY OF PRESENT ILLNESS:      2024     1:37 PM   Questions Regarding Prior Weight Loss Surgery Reviewed With Patient   I had the following weight loss procedure Pacheco-en-y Gastric Bypass   What year was your surgery?    How has your weight changed since your last visit? I have lost weight   Do you currently have any of the following Hypertension (high blood pressure)?   Do you have any concerns today? no         Plan  Continue wegovy 0.25, contact clinic if you are struggling to find the 0.5mg dose, may consider extra month at 0.25mg dose   Okay to increase to 1mg after completing 1 month of 0.5mg as long as tolerating, will send message checking in at this time.   Goals we discussed today: working towards 90g of protein, incorporating weight/resistance training twice a week   Will contact [facility] to have them fax over post op labs   Follow up with Adilene in 3 months   Dietician appointment with Lia Khanna today, please call insurance to see if dietician visits are covered.    Keep up the excellent work!         INTERVAL HISTORY:  New MWM with me 24  Overweight onset in high school, but saw more significant weight gain in college. Age 16 weighed  approximately 155lbs. Throughout college gained  up to 200, continued to gain up to 255, which was highest weight in life.      Was diagnosed with type 2 diabetes in 2008, but blood sugar has not reached diabetes levels since her RYGB.   Had a RYGB in 2009 through [facility].  Ihsan weight after RYGB was 155 lbs. Has gradually regained this weight over the last 15 years, up to current weight of 215lbs which is her highest weight since surgery.      Regarding any issues post RYGB: Experiences loose stools after eating high carbohydrate meals since surgery, sometimes stools are loose even if the meal wasn't high in carbs or sugar.   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 She ate a piece of candy and felt better afterwards. Has experienced other episodes dizziness and fatigue after not eating for long stretches of time, has not checked her blood sugar, describes that none were worrisome to her aside from that one episode as an ED nurse.   Had one episode of severe abdominal pain after eating a very large amount of food 7-8 years ago that lead to a brief hospitalization and management through [facility] due to concern for a bowel obstruction , was monitored over night with close monitoring and was eventually able to go home without surgical intervention, the presumed diagnosis was related to food impaction. Aside from these aforementioned issues, has not seen any other issues related to the RYGB (denies dysphagia, nausea, abdominal pain).  Labs and post bariatric vitamins managed annually through PCP Alba Sharma in [place].      Aside from RYGB, at times has been able to lose 10lbs in the past with increased exercise but this weight will come back once exercise stopped, Crystal struggles to exercise lately due to frequent ankle pain.       Past obstacles in weight loss  include mental health (depression and anxiety inhibiting from making changes), not getting to move around at work, issues with managing stress, not being able to pick the right foods, worsening exercise tolerance, business with taking care of 3 kids.      Comorbidities associated with weight gain include high blood pressure, fatty liver, GERD (occasionally, managed as needed with tums), back pain, ankle pain, history of elevated cholesterol, history of type 2 diabetes diagnosis.      Motivators for weight loss include improve overall health, be able to live as long as possible to be around for her kids.      She is interested in starting a medication as a tool for working towards sustainable weight loss.     Regarding eating patterns and diet,  she typically eats 2 meals a day. Craves pasta and bread, experiences food noise throughout the day. Is able to get full. Can stay full. Eats out/ gets take out a few times a week. Drinks diet energy drink, coffee with splenda and cream, diet coke once a week.    First meal, 10:30 or 11:00 - will eat leftovers from supper, doughnuts in the office. Sometimes will eat a cheese stick. Typically eats alone, doesn't like people watching her eat.   Second meal around 5:00 - supper. Hamburger, chicken, fish, or steak; mashed potato, baked potatos, pasta; vegetables   After supper might have a bite of food but otherwise doesn't typically eat  Seldomly wakes up in the middle of the night to eat (maybe once every few months).      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 Has frequent ankle pain (foot surgery 20 years ago related to this), makes it harder to walk. Likes walking generally, hopes to take walk breaks at work as her work encourages her to do this.       Since last visit:   Started zepbound, switched to wegovy due to supply.   Has not completed RYGB post op labs    Started wegovy   Wt Readings  from Last 5 Encounters:   04/30/24 95.9 kg (211 lb 8 oz)   02/07/24 97.5 kg (215 lb)       Anti-obesity medication history    Current:   Wegovy 0.25- 2 doses in. Some nausea with first dose, lasted 3.5 days. No nausea with second dose. No vomiting since starting wegovy.    Past/Failed/contraindicated:   Zepbound- prescribed, never started due to high monthly cost     GERD symptoms: none     Constipation/Diarrhea: none     Recent diet changes: limiting portion sizes, eating more protein and chicken. Notices more fatigue after eating bread so is avoiding this.       Recent exercise/activity changes: walking more. Trying to go for walks in the middle of the day at work.  Some back pain lately.     Recent stressors: worse lately, busy with HR stuff. Feels she is pretty good at leaving work at work.     Recent sleep changes: 7 hours a night, feels sleep is better     Vitamins/Labs: post op rygb labs have been ordered     Pregnancy: perimenopausal       Weight History:      4/30/2024     1:37 PM   --   What is your highest lifetime weight? 265   What is your lowest weight since surgery? (In pounds) 175     Initial Weight (lbs): 215 lbs  Weight: 95.9 kg (211 lb 8 oz)     Cumulative weight loss (lbs): 3.5  Weight Loss Percentage: 1.63%        4/30/2024     1:37 PM   Questions Regarding Co-Morbidities and Health Concerns Reviewed With Patient   Pre-diabetes Stayed the same   Diabetes II Improved   High Blood Pressure Stayed the same   High cholesterol Never   Heartburn/Reflux Never   Sleep apnea Never   PCOS Never   Back pain Worsened   Joint pain Never   Lower leg swelling Never           4/30/2024     1:37 PM   Eating Habits   How many meals do you eat per day? 3   Do you snack between meals? Yes   How much food are you eating at each meal? 1/2 cup to 1 cup   Are you able to separate your meals and liquids by at least 30 minutes? Yes   Are you able to avoid liquid calories? Yes           4/30/2024     1:37 PM   Exercise  Questions Reviewed With Patient   How often do you exercise? 3 to 4 times per week   What is the duration of your exercise (in minutes)? 30 Minutes   What types of exercise do you do? walking   What keeps you from being more active? I am as active as I can possbily be       Social History:      4/30/2024     1:37 PM   --   Are you smoking? No   Are you drinking alcohol? Yes   How much alcohol? 3 drinks       Medications:  Current Outpatient Medications   Medication Sig Dispense Refill    FERROUS FUMARATE-VITAMIN C PO Take 29 mg by mouth daily      fish oil-omega-3 fatty acids 1000 MG capsule Take 1,000 mg by mouth daily      lisinopril (ZESTRIL) 20 MG tablet Take 20 mg by mouth daily      Multiple Vitamins-Minerals (OCUVITE-LUTEIN PO) Take 1 tablet by mouth 2 times daily      Semaglutide-Weight Management (WEGOVY) 0.25 MG/0.5ML pen Inject 0.25 mg Subcutaneous once a week For 4 weeks 2 mL 0    Semaglutide-Weight Management (WEGOVY) 0.5 MG/0.5ML pen Inject 0.5 mg Subcutaneous once a week After completing 4 weeks of 0.25mg dose 2 mL 0    Thiamine Mononitrate (VITAMIN B-1) 100 MG TABS Take 100 mg by mouth daily      vitamin B-12 (CYANOCOBALAMIN) 100 MCG tablet Take 100 mcg by mouth daily      tirzepatide-Weight Management (ZEPBOUND) 2.5 MG/0.5ML prefilled pen Inject 0.5 mLs (2.5 mg) Subcutaneous every 7 days For 4 weeks 2 mL 0    tirzepatide-Weight Management (ZEPBOUND) 5 MG/0.5ML prefilled pen Inject 0.5 mLs (5 mg) Subcutaneous every 7 days After completing 4 weeks of taking the 2.5mg dose 2 mL 2     No current facility-administered medications for this visit.         4/30/2024     1:37 PM   --   Do you avoid NSAIDs such as (Ibuprofen, Aleve, Naproxen, Advil)? Yes       ROS:  GI:       4/30/2024     1:37 PM   --   Vomiting No   Diarrhea Yes   Constipation No   Swallowing trouble No   Abdominal pain No   Heartburn No     Skin:       4/30/2024     1:37 PM   BAR RBS ROS - SKIN   Rash in skin folds No     Psych:        "4/30/2024     1:37 PM   --   Depression No   Anxiety No     Female Only:       4/30/2024     1:37 PM   BAR RBS ROS -    Female only None of the above   Stress urinary incontinence No                No data to display                  PHYSICAL EXAM:  Objective    Ht 1.651 m (5' 5\")   Wt 95.9 kg (211 lb 8 oz)   BMI 35.20 kg/m    Vitals - Patient Reported  Pain Score: No Pain (1)        Physical Exam   GENERAL: alert and no distress  EYES: Eyes grossly normal to inspection.  No discharge or erythema, or obvious scleral/conjunctival abnormalities.  RESP: No audible wheeze, cough, or visible cyanosis.    SKIN: Visible skin clear. No significant rash, abnormal pigmentation or lesions.  NEURO: Cranial nerves grossly intact.  Mentation and speech appropriate for age.  PSYCH: Appropriate affect, tone, and pace of words        Sincerely,    Adilene Doran PA-C    "

## 2024-04-30 NOTE — NURSING NOTE
Is the patient currently in the state of [place]? YES    Visit mode:VIDEO    If the visit is dropped, the patient can be reconnected by: VIDEO VISIT: Text to cell phone:   Telephone Information:   Mobile [phone]       Will anyone else be joining the visit? NO  (If patient encounters technical issues they should call [phone])    How would you like to obtain your AVS? MyChart    Are changes needed to the allergy or medication list? No    Are refills needed on medications prescribed by this physician? NO     Reason for visit: RECHECK    Wt/ht other than 24 hrs:   Pain more than one location:  no  Jimena ACHARYA

## 2024-04-30 NOTE — LETTER
2024       RE: Becky Barrios  [address]  [facility] [address]     Dear Colleague,    Thank you for referring your patient, Becky Barrios, to the [facility]NA[facility] [place] [facility]. Please see a copy of my visit note below.    Return Bariatric Surgery Note    RE: Becky Barrios  MR#: [id number]  : 1980  VISIT DATE: 2024      Dear Alba Shamra,    I had the pleasure of seeing your patient, Becky Barrios, in my post-bariatric surgery assessment clinic.    Assessment & Plan  Problem List Items Addressed This Visit       Class 2 severe obesity with serious comorbidity and body mass index (BMI) of 35.0 to 35.9 in adult, unspecified obesity type (H) - Primary    History of Pacheco-en-Y gastric bypass          31 minutes spent by me on the date of the encounter doing chart review, history and exam, documentation and further activities per the note    CHIEF COMPLAINT: Post-bariatric surgery follow-up. RYGB in  with subsequent weight gain.     HISTORY OF PRESENT ILLNESS:      2024     1:37 PM   Questions Regarding Prior Weight Loss Surgery Reviewed With Patient   I had the following weight loss procedure Pacheco-en-y Gastric Bypass   What year was your surgery?    How has your weight changed since your last visit? I have lost weight   Do you currently have any of the following Hypertension (high blood pressure)?   Do you have any concerns today? no         Plan  Continue wegovy 0.25, contact clinic if you are struggling to find the 0.5mg dose, may consider extra month at 0.25mg dose   Okay to increase to 1mg after completing 1 month of 0.5mg as long as tolerating, will send message checking in at this time.   Goals we discussed today: working towards 90g of protein, incorporating weight/resistance training twice a week   Will contact [facility] to have them fax over post op labs   Follow  up with Adilene in 3 months   Dietician appointment with Lia Khanna today, please call insurance to see if dietician visits are covered.    Keep up the excellent work!         INTERVAL HISTORY:  New MWM with me 2/7/24  Overweight onset in high school, but saw more significant weight gain in college. Age 16 weighed approximately 155lbs. Throughout college gained  up to 200, continued to gain up to 255, which was highest weight in life.      Was diagnosed with type 2 diabetes in 2008, but blood sugar has not reached diabetes levels since her RYGB.   Had a RYGB in 2009 through [facility].  Ihsan weight after RYGB was 155 lbs. Has gradually regained this weight over the last 15 years, up to current weight of 215lbs which is her highest weight since surgery.      Regarding any issues post RYGB: Experiences loose stools after eating high carbohydrate meals since surgery, sometimes stools are loose even if the meal wasn't high in carbs or sugar.   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 She ate a piece of candy and felt better afterwards. Has experienced other episodes dizziness and fatigue after not eating for long stretches of time, has not checked her blood sugar, describes that none were worrisome to her aside from that one episode as an ED nurse.   Had one episode of severe abdominal pain after eating a very large amount of food 7-8 years ago that lead to a brief hospitalization and management through [facility] due to concern for a bowel obstruction , was monitored over night with close monitoring and was eventually able to go home without surgical intervention, the presumed diagnosis was related to food impaction. Aside from these aforementioned issues, has not seen any other issues related to the RYGB (denies dysphagia, nausea, abdominal pain).  Labs and post bariatric  vitamins managed annually through PCP Alba Sharma in [place].      Aside from RYGB, at times has been able to lose 10lbs in the past with increased exercise but this weight will come back once exercise stopped, Becky struggles to exercise lately due to frequent ankle pain.       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      Comorbidities associated with weight gain include high blood pressure, fatty liver, GERD (occasionally, managed as needed with tums), back pain, ankle pain, history of elevated cholesterol, history of type 2 diabetes diagnosis.      Motivators for weight loss include improve overall health, be able to live as long as possible to be around for her kids.      She is interested in starting a medication as a tool for working towards sustainable weight loss.     Regarding eating patterns and diet,  she typically eats 2 meals a day. Craves pasta and bread, experiences food noise throughout the day. Is able to get full. Can stay full. Eats out/ gets take out a few times a week. Drinks diet energy drink, coffee with splenda and cream, diet coke once a week.    First meal, 10:30 or 11:00 - will eat leftovers from supper, doughnuts in the office. Sometimes will eat a cheese stick. Typically eats alone, doesn't like people watching her eat.   Second meal around 5:00 - supper. Hamburger, chicken, fish, or steak; mashed potato, baked potatos, pasta; vegetables   After supper might have a bite of food but otherwise doesn't typically eat  Seldomly wakes up in the middle of the night to eat (maybe once every few months).      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 Has frequent ankle  pain (foot surgery 20 years ago related to this), makes it harder to walk. Likes walking generally, hopes to take walk breaks at work as her work encourages her to do this.       Since last visit:   Started zepbound, switched to wegovy due to supply.   Has not completed RYGB post op labs    Started wegovy   Wt Readings from Last 5 Encounters:   04/30/24 95.9 kg (211 lb 8 oz)   02/07/24 97.5 kg (215 lb)       Anti-obesity medication history    Current:   Wegovy 0.25- 2 doses in. Some nausea with first dose, lasted 3.5 days. No nausea with second dose. No vomiting since starting wegovy.    Past/Failed/contraindicated:   Zepbound- prescribed, never started due to high monthly cost     GERD symptoms: none     Constipation/Diarrhea: none     Recent diet changes: limiting portion sizes, eating more protein and chicken. Notices more fatigue after eating bread so is avoiding this.       Recent exercise/activity changes: walking more. Trying to go for walks in the middle of the day at work.  Some back pain lately.     Recent stressors: worse lately, busy with HR stuff. Feels she is pretty good at leaving work at work.     Recent sleep changes: 7 hours a night, feels sleep is better     Vitamins/Labs: post op rygb labs have been ordered     Pregnancy: perimenopausal       Weight History:      4/30/2024     1:37 PM   --   What is your highest lifetime weight? 265   What is your lowest weight since surgery? (In pounds) 175     Initial Weight (lbs): 215 lbs  Weight: 95.9 kg (211 lb 8 oz)     Cumulative weight loss (lbs): 3.5  Weight Loss Percentage: 1.63%        4/30/2024     1:37 PM   Questions Regarding Co-Morbidities and Health Concerns Reviewed With Patient   Pre-diabetes Stayed the same   Diabetes II Improved   High Blood Pressure Stayed the same   High cholesterol Never   Heartburn/Reflux Never   Sleep apnea Never   PCOS Never   Back pain Worsened   Joint pain Never   Lower leg swelling Never           4/30/2024     1:37  PM   Eating Habits   How many meals do you eat per day? 3   Do you snack between meals? Yes   How much food are you eating at each meal? 1/2 cup to 1 cup   Are you able to separate your meals and liquids by at least 30 minutes? Yes   Are you able to avoid liquid calories? Yes           4/30/2024     1:37 PM   Exercise Questions Reviewed With Patient   How often do you exercise? 3 to 4 times per week   What is the duration of your exercise (in minutes)? 30 Minutes   What types of exercise do you do? walking   What keeps you from being more active? I am as active as I can possbily be       Social History:      4/30/2024     1:37 PM   --   Are you smoking? No   Are you drinking alcohol? Yes   How much alcohol? 3 drinks       Medications:  Current Outpatient Medications   Medication Sig Dispense Refill    FERROUS FUMARATE-VITAMIN C PO Take 29 mg by mouth daily      fish oil-omega-3 fatty acids 1000 MG capsule Take 1,000 mg by mouth daily      lisinopril (ZESTRIL) 20 MG tablet Take 20 mg by mouth daily      Multiple Vitamins-Minerals (OCUVITE-LUTEIN PO) Take 1 tablet by mouth 2 times daily      Semaglutide-Weight Management (WEGOVY) 0.25 MG/0.5ML pen Inject 0.25 mg Subcutaneous once a week For 4 weeks 2 mL 0    Semaglutide-Weight Management (WEGOVY) 0.5 MG/0.5ML pen Inject 0.5 mg Subcutaneous once a week After completing 4 weeks of 0.25mg dose 2 mL 0    Thiamine Mononitrate (VITAMIN B-1) 100 MG TABS Take 100 mg by mouth daily      vitamin B-12 (CYANOCOBALAMIN) 100 MCG tablet Take 100 mcg by mouth daily      tirzepatide-Weight Management (ZEPBOUND) 2.5 MG/0.5ML prefilled pen Inject 0.5 mLs (2.5 mg) Subcutaneous every 7 days For 4 weeks 2 mL 0    tirzepatide-Weight Management (ZEPBOUND) 5 MG/0.5ML prefilled pen Inject 0.5 mLs (5 mg) Subcutaneous every 7 days After completing 4 weeks of taking the 2.5mg dose 2 mL 2     No current facility-administered medications for this visit.         4/30/2024     1:37 PM   --   Do you  "avoid NSAIDs such as (Ibuprofen, Aleve, Naproxen, Advil)? Yes       ROS:  GI:       4/30/2024     1:37 PM   --   Vomiting No   Diarrhea Yes   Constipation No   Swallowing trouble No   Abdominal pain No   Heartburn No     Skin:       4/30/2024     1:37 PM   BAR RBS ROS - SKIN   Rash in skin folds No     Psych:       4/30/2024     1:37 PM   --   Depression No   Anxiety No     Female Only:       4/30/2024     1:37 PM   BAR RBS ROS -    Female only None of the above   Stress urinary incontinence No                No data to display                  PHYSICAL EXAM:  Objective   Ht 1.651 m (5' 5\")   Wt 95.9 kg (211 lb 8 oz)   BMI 35.20 kg/m    Vitals - Patient Reported  Pain Score: No Pain (1)        Physical Exam   GENERAL: alert and no distress  EYES: Eyes grossly normal to inspection.  No discharge or erythema, or obvious scleral/conjunctival abnormalities.  RESP: No audible wheeze, cough, or visible cyanosis.    SKIN: Visible skin clear. No significant rash, abnormal pigmentation or lesions.  NEURO: Cranial nerves grossly intact.  Mentation and speech appropriate for age.  PSYCH: Appropriate affect, tone, and pace of words        Sincerely,    Adilene Doran PA-C      Virtual Visit Details    Type of service:  Video Visit     Originating Location (pt. Location): Home    Distant Location (provider location):  Off-site  Platform used for Video Visit: Piper    "

## 2024-04-30 NOTE — PATIENT INSTRUCTIONS
"Thank you for allowing us the privilege of caring for you. We hope we provided you with the excellent service you deserve.   Please let us know if there is anything else we can do for you so that we can be sure you are completely satisfied with your care experience.    To ensure the quality of our services you may be receiving a patient satisfaction survey from an independent patient satisfaction monitoring company.    The greatest compliment you can give is a \"Likely to Recommend\"    Your visit was with Adilene Doran PA-C today.    Instructions per today's visit:     Colt Barrios, it was great to visit with you today.  Here is a review of our visit.  If our clinic scheduler is not able to reach you please call [phone] to schedule your next appointments.      Plan  Continue wegovy 0.25, contact clinic if you are struggling to find the 0.5mg dose, may consider extra month at 0.25mg dose   Okay to increase to 1mg after completing 1 month of 0.5mg as long as tolerating, will send message checking in at this time.   Goals we discussed today: working towards 90g of protein, incorporating weight/resistance training twice a week   Will contact [facility] to have them fax over post op labs   Follow up with Adilene in 3 months   Dietician appointment with Lia Khanna today, please call insurance to see if dietician visits are covered.    Keep up the excellent work!       Information about Video Visits with [employer]: video visit information  _________________________________________________________________________________________________________________________________________________________  If you are asked by your clinic team to have your blood pressure checked:  [facility] do offer several locations for blood pressure checks. Please follow the below link to schedule an appointment. Scheduling an appointment at the pharmacy for a blood pressure check is now " preferred.    Appointment Plus ([email or web address])  _________________________________________________________________________________________________________________________________________________________  Important contact and scheduling information:  Please call our contact center at [phone] to schedule your next appointments.  To find a lab location near you, please call [phone].  For any nursing questions or concerns call Sofia Escobar LPN at [phone] or Jenni Richter RN at [phone]  Please call during clinic hours Monday through Friday 8:00a - 4:00p if you have questions or you can contact us via [employer] at anytime and we will reply during clinic hours.    Lab results will be communicated through My Chart or letter (if My Chart not used). Please call the clinic if you have not received communication after 1 week or if you have any questions.?  Clinic Fax: [phone]    _________________________________________________________________________________________________________________________________________________________  Meal Replacement Products:    Here is the link to our new e-store where you can purchase our meal replacement products    [facility] E-Store  [email or web address]    The one week starter kit is a great way to sample a variety of products and see what works for you.    If you want more information about the product go to: Fresh Steps Pure Focus    If you are an employee or [facility] [employer] or [facility] please contact your care team for a 10% estore discount    Free Shipping for orders over $75     Benefits of meal replacements products:    Portion and calorie control  Improved nutrition  Structured eating  Simplified food choices  Avoid contact with trigger  foods  _________________________________________________________________________________________________________________________________________________________  Interested in working with a health ?  Health coaches work with you to improve your overall health and wellbeing.  They look at the whole person, and may involve discussion of different areas of life, including, but not limited to the four pillars of health (sleep, exercise, nutrition, and stress management). Discuss with your care team if you would like to start working a health .  Health Coaching-3 Pack: Schedule by calling [phone]    $99 for three health coaching visits    Visits may be done in person or via phone    Coaching is a partnership between the  and the client; Coaches do not prescribe or diagnose    Coaching helps inspire the client to reach his/her personal goals   _________________________________________________________________________________________________________________________________________________________  24 Week Healthy Lifestyle Plan:    Our mission in the 24-week Healthy Lifestyle Plan is to provide you with individualized care by giving you the tools, education and support you need to lose weight and maintain a healthy lifestyle. In your 24-week journey, you ll be supported by a dedicated weight loss team that includes registered dietitians, medical weight management providers, health coaches, and nurses -- all with special expertise in weight loss -- to help you every step of the way.     Monthly meetings with your registered dietician or medical weight management provider help to review your progress, update your care plan, and make any adjustments needed to ensure success. Between these visits, weekly and bi-weekly health  visits will help you focus on the four pillars of weight loss -- stress, sleep, nutrition, and exercise -- and how you can best adapt each to achieve sustainable weight loss  results.    In addition, you will be given exclusive access to online wellbeing classes through [employer].  Your initial visit will be with a medical weight management provider who will help to understand your weight loss goals and ensure this program is the right fit for you. Please let our team know if you are interested in the 24 week plan by sending a message to your care team or calling [phone] to schedule.  _________________________________________________________________________________________________________________________________________________________  __________  [facility] Get Moving Program  Our team of physical therapists is trained to help you understand and take control of your condition. They will perform a thorough evaluation to determine your ability for activity and develop a customized plan to fit your goals and physical ability.  Scheduling: Unsure if the Get Moving program is right for you? Discuss the program with your medical provider or diabetes educator. You can also call us at [phone] to ask questions or schedule an appointment.   PHILIPEP Get Moving Program  ____________________________________________________________________________________________________________________________________________________________________________   [employer] [place] Diabetes Prevention Program (DPP)  If you have prediabetes and Medicare please contact us via [employer] to learn more about the Diabetes Prevention Program (DPP)  Program Details:   [employer] [place] offers the year-long Diabetes Prevention Program (DPP). The program helps you to make lifestyle changes that prevent or delay type 2 diabetes by supporting healthy eating, increased physical activity, stress reduction and use of coping skills.   On average, previous [facility] DPP cohorts have lost and maintained at least 5% of their starting weight throughout the program and averaged more than 150 minutes of physical  activity per week.  Participants meet weekly for one-hour group sessions over sixteen weeks, every other week for the next 8 weeks, and monthly for the last six months.   A year-long maintenance program is also available for participants who complete the first year.   Location & Cost:   During the COVID-19 Public Health Emergency, the program is offered virtually. When in-person classes can resume, they will be held at [facility].  For people with Medicare, the program is covered in full. A self-pay option will also be available for those with non-Medicare insurance plans.   ______________________________________________________________________________________________________________________________________________________________________________________________________________________________    To work with a Behavioral Health Psychologist:    Call to schedule:    Tapan Moya - [phone]  Nolan Obrien - [phone]  Donna Garrison - [phone]  Trish Yost - [phone]   Kristi Hein PhD (cannot accept Medicare) [phone]        Thank you,   [facility] Comprehensive Weight Management Team

## 2024-05-03 ENCOUNTER — TELEPHONE (OUTPATIENT)
Dept: ENDOCRINOLOGY | Facility: CLINIC | Age: 44
End: 2024-05-03
Payer: COMMERCIAL

## 2024-05-03 NOTE — TELEPHONE ENCOUNTER
Left Voicemail (1st Attempt) and Sent Mychart (1st Attempt) for the patient to call back and schedule the following:    Appointment type: AYLA CARMONA  Provider: Adilene Doran  Return date: around 7/30/24  Specialty phone number: [phone]  Additional appointment(s) needed: n/a  Additonal Notes: n/a

## 2024-05-24 DIAGNOSIS — E66.01 CLASS 2 SEVERE OBESITY WITH SERIOUS COMORBIDITY AND BODY MASS INDEX (BMI) OF 35.0 TO 35.9 IN ADULT, UNSPECIFIED OBESITY TYPE (H): Primary | ICD-10-CM

## 2024-05-24 DIAGNOSIS — E66.812 CLASS 2 SEVERE OBESITY WITH SERIOUS COMORBIDITY AND BODY MASS INDEX (BMI) OF 35.0 TO 35.9 IN ADULT, UNSPECIFIED OBESITY TYPE (H): Primary | ICD-10-CM

## 2024-05-24 RX ORDER — SEMAGLUTIDE 1 MG/.5ML
1 INJECTION, SOLUTION SUBCUTANEOUS WEEKLY
Qty: 2 ML | Refills: 2 | Status: SHIPPED | OUTPATIENT
Start: 2024-05-24

## 2024-08-28 ENCOUNTER — TELEPHONE (OUTPATIENT)
Dept: ENDOCRINOLOGY | Facility: CLINIC | Age: 44
End: 2024-08-28
Payer: COMMERCIAL

## 2024-08-28 DIAGNOSIS — E66.812 CLASS 2 SEVERE OBESITY WITH SERIOUS COMORBIDITY AND BODY MASS INDEX (BMI) OF 35.0 TO 35.9 IN ADULT, UNSPECIFIED OBESITY TYPE (H): ICD-10-CM

## 2024-08-28 DIAGNOSIS — E66.01 CLASS 2 SEVERE OBESITY WITH SERIOUS COMORBIDITY AND BODY MASS INDEX (BMI) OF 35.0 TO 35.9 IN ADULT, UNSPECIFIED OBESITY TYPE (H): ICD-10-CM

## 2024-08-29 RX ORDER — SEMAGLUTIDE 1 MG/.5ML
1 INJECTION, SOLUTION SUBCUTANEOUS WEEKLY
Qty: 2 ML | Refills: 2 | OUTPATIENT
Start: 2024-08-29

## 2024-08-29 NOTE — TELEPHONE ENCOUNTER
LVD:  4/30/2024  [facility] [place]     Adilene Doran PA-C  Physician Assistant - Medical Class 2 severe obesity   NVD: none  Refill denied per protocol.  Received refill request for   .    Semaglutide-Weight Management (WEGOVY) 1 MG/0.5ML pen    Patient needs appointment scheduled prior to any refills. Clinic Coordinator notified and will follow up with the patient as appropriate. The pharmacy has been notified that the medication will not be refilled prior to an appointment being scheduled.          
Refill denied at this time. Patient needs appointment with Adilene Doran PA-C. Sam message sent to patient to schedule appointment.     
Multiple sclerosis

## 2024-09-03 DIAGNOSIS — E66.812 CLASS 2 SEVERE OBESITY WITH SERIOUS COMORBIDITY AND BODY MASS INDEX (BMI) OF 35.0 TO 35.9 IN ADULT, UNSPECIFIED OBESITY TYPE (H): Primary | ICD-10-CM

## 2024-09-03 DIAGNOSIS — E66.01 CLASS 2 SEVERE OBESITY WITH SERIOUS COMORBIDITY AND BODY MASS INDEX (BMI) OF 35.0 TO 35.9 IN ADULT, UNSPECIFIED OBESITY TYPE (H): Primary | ICD-10-CM

## 2024-09-03 DIAGNOSIS — E66.812 CLASS 2 SEVERE OBESITY WITH SERIOUS COMORBIDITY AND BODY MASS INDEX (BMI) OF 35.0 TO 35.9 IN ADULT, UNSPECIFIED OBESITY TYPE (H): ICD-10-CM

## 2024-09-03 DIAGNOSIS — Z98.84 HISTORY OF ROUX-EN-Y GASTRIC BYPASS: ICD-10-CM

## 2024-09-03 DIAGNOSIS — E66.01 CLASS 2 SEVERE OBESITY WITH SERIOUS COMORBIDITY AND BODY MASS INDEX (BMI) OF 35.0 TO 35.9 IN ADULT, UNSPECIFIED OBESITY TYPE (H): ICD-10-CM

## 2024-09-03 RX ORDER — SEMAGLUTIDE 1.7 MG/.75ML
1.7 INJECTION, SOLUTION SUBCUTANEOUS WEEKLY
Qty: 3 ML | Refills: 1 | Status: SHIPPED | OUTPATIENT
Start: 2024-09-03

## 2024-09-04 ENCOUNTER — TRANSFERRED RECORDS (OUTPATIENT)
Dept: HEALTH INFORMATION MANAGEMENT | Facility: CLINIC | Age: 44
End: 2024-09-04

## 2024-09-04 RX ORDER — SEMAGLUTIDE 1 MG/.5ML
1 INJECTION, SOLUTION SUBCUTANEOUS WEEKLY
Qty: 2 ML | Refills: 2 | OUTPATIENT
Start: 2024-09-04

## 2024-09-04 NOTE — TELEPHONE ENCOUNTER
Signed Yesterday (9/3/2024):   Semaglutide-Weight Management (WEGOVY) 1.7 MG/0.75ML pen   Sig: Inject 1.7 mg subcutaneously once a week.   Disp: 3 mL    Refills: 1   Signed by: Adilene Doran PA-C

## 2024-10-16 ENCOUNTER — TELEPHONE (OUTPATIENT)
Dept: ENDOCRINOLOGY | Facility: CLINIC | Age: 44
End: 2024-10-16

## 2024-10-16 ENCOUNTER — VIRTUAL VISIT (OUTPATIENT)
Dept: ENDOCRINOLOGY | Facility: CLINIC | Age: 44
End: 2024-10-16
Payer: COMMERCIAL

## 2024-10-16 VITALS — HEIGHT: 65 IN | BODY MASS INDEX: 31.32 KG/M2 | WEIGHT: 188 LBS

## 2024-10-16 DIAGNOSIS — E66.01 CLASS 2 SEVERE OBESITY WITH SERIOUS COMORBIDITY AND BODY MASS INDEX (BMI) OF 35.0 TO 35.9 IN ADULT, UNSPECIFIED OBESITY TYPE (H): ICD-10-CM

## 2024-10-16 DIAGNOSIS — Z98.84 HISTORY OF ROUX-EN-Y GASTRIC BYPASS: ICD-10-CM

## 2024-10-16 DIAGNOSIS — E66.812 CLASS 2 SEVERE OBESITY WITH SERIOUS COMORBIDITY AND BODY MASS INDEX (BMI) OF 35.0 TO 35.9 IN ADULT, UNSPECIFIED OBESITY TYPE (H): ICD-10-CM

## 2024-10-16 PROCEDURE — G2211 COMPLEX E/M VISIT ADD ON: HCPCS | Mod: 95

## 2024-10-16 PROCEDURE — 99212 OFFICE O/P EST SF 10 MIN: CPT | Mod: 95

## 2024-10-16 RX ORDER — SEMAGLUTIDE 1.7 MG/.75ML
1.7 INJECTION, SOLUTION SUBCUTANEOUS WEEKLY
Qty: 3 ML | Refills: 3 | Status: SHIPPED | OUTPATIENT
Start: 2024-10-16 | End: 2024-10-29

## 2024-10-16 ASSESSMENT — PAIN SCALES - GENERAL: PAINLEVEL: NO PAIN (0)

## 2024-10-16 NOTE — PROGRESS NOTES
Virtual Visit Details    Type of service:  Video Visit     Originating Location (pt. Location): Home    Distant Location (provider location):  Off-site  Platform used for Video Visit: [facility] Medical Weight Management Note     Becky Barrios  MRN:  [id number]  :  1980  MAGED:  10/16/2024    Dear Alba Sharma NP,    I had the pleasure of seeing your patient Becky Barrios. She is a 44 year old female who I am continuing to see for treatment of obesity related to:        2024     1:30 PM   --   I have the following health issues associated with obesity Pre-Diabetes    High Blood Pressure    Fatty Liver   I have the following symptoms associated with obesity Back Pain    Fatigue    Hip Pain       Assessment & Plan   Problem List Items Addressed This Visit       Class 2 severe obesity with serious comorbidity and body mass index (BMI) of 35.0 to 35.9 in adult, unspecified obesity type (H)    Relevant Medications    Semaglutide-Weight Management (WEGOVY) 1.7 MG/0.75ML pen    History of Pacheco-en-Y gastric bypass    Relevant Medications    Semaglutide-Weight Management (WEGOVY) 1.7 MG/0.75ML pen      Plan  Continue wegovy 1.7mg   Goals we discussed today:   90g protein daily   Utilizing fitness center during the winter   Labs completed 2024- will have [facility]   Follow up with Adilene in 4 months   Dietician appointment not needed- has access to RD through primary care clinic   Keep up the excellent work!       INTERVAL HISTORY:  New MWM with me 24  Overweight onset in high school, but saw more significant weight gain in college. Age 16 weighed approximately 155lbs. Throughout college gained  up to 200, continued to gain up to 255, which was highest weight in life.      Was diagnosed with type 2 diabetes in , but blood sugar has not reached diabetes levels since her RYGB.   Had a RYGB in  through [facility].  Ihsan weight  after RYGB was 155 lbs. Has gradually regained this weight over the last 15 years, up to current weight of 215lbs which is her highest weight since surgery.      Regarding any issues post RYGB: Experiences loose stools after eating high carbohydrate meals since surgery, sometimes stools are loose even if the meal wasn't high in carbs or sugar.   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 She ate a piece of candy and felt better afterwards. Has experienced other episodes dizziness and fatigue after not eating for long stretches of time, has not checked her blood sugar, describes that none were worrisome to her aside from that one episode as an ED nurse.   Had one episode of severe abdominal pain after eating a very large amount of food 7-8 years ago that lead to a brief hospitalization and management through [facility] due to concern for a bowel obstruction , was monitored over night with close monitoring and was eventually able to go home without surgical intervention, the presumed diagnosis was related to food impaction. Aside from these aforementioned issues, has not seen any other issues related to the RYGB (denies dysphagia, nausea, abdominal pain).  Labs and post bariatric vitamins managed annually through PCP Alba Sharma in [place].      Aside from RYGB, at times has been able to lose 10lbs in the past with increased exercise but this weight will come back once exercise stopped, Crystal struggles to exercise lately due to frequent ankle pain.       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      Comorbidities associated with weight  gain include high blood pressure, fatty liver, GERD (occasionally, managed as needed with tums), back pain, ankle pain, history of elevated cholesterol, history of type 2 diabetes diagnosis.      Motivators for weight loss include improve overall health, be able to live as long as possible to be around for her kids.      She is interested in starting a medication as a tool for working towards sustainable weight loss.     Regarding eating patterns and diet,  she typically eats 2 meals a day. Craves pasta and bread, experiences food noise throughout the day. Is able to get full. Can stay full. Eats out/ gets take out a few times a week. Drinks diet energy drink, coffee with splenda and cream, diet coke once a week.    First meal, 10:30 or 11:00 - will eat leftovers from supper, doughnuts in the office. Sometimes will eat a cheese stick. Typically eats alone, doesn't like people watching her eat.   Second meal around 5:00 - supper. Hamburger, chicken, fish, or steak; mashed potato, baked potatos, pasta; vegetables   After supper might have a bite of food but otherwise doesn't typically eat  Seldomly wakes up in the middle of the night to eat (maybe once every few months).      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 Has frequent ankle pain (foot surgery 20 years ago related to this), makes it harder to walk. Likes walking generally, hopes to take walk breaks at work as her work encourages her to do this.         Last seen by me 4/30/24:   Started zepbound, switched to wegovy due to supply.   Has not completed RYGB post op labs    Started wegovy     -titrated up to 1.7mg     Today in visit 10/16/24   23lbs weight loss since last visit   Feeling good on wegovy   Has gotten post op labs done through [facility]     Ever since RYGB- experiencing fecal urgency after breakfast, this seems to happen to her no matter what  she eats (regardless of carb content), will have to run to the bathroom- experiences loose stool or diarrhea.  Discussed keeping a food journal to confirm whether there are any specific foods triggering this.       Wt Readings from Last 5 Encounters:   10/16/24 85.3 kg (188 lb)   04/30/24 95.9 kg (211 lb 8 oz)   02/07/24 97.5 kg (215 lb)       Anti-obesity medication history    Current:   Wegovy 1.7mg- helpful with reducing portions, getting full faster. A few weeks into this dose, tolerating.     GERD symptoms: denies     Constipation/Diarrhea: denies     Recent diet changes: no major changes.     Protein- continuing to prioritize before other meals- resources sent via [employer]     Recent exercise/activity changes: going on lots of walks- has goal to increase strength training. Has a nearby fitness center, has access to it. Discussed building that into her week     Vitamins/Labs:  post op labs ordered at new [facility] visit, Becky completed them at [facility]. Will request these labs to be faxed back over. Currently taking b12, thiamine, calcium, vitamin d, iron supplement, fish oil, vitamin c.       CURRENT WEIGHT:   188 lbs 0 oz                      10/15/2024     4:48 PM   Changes and Difficulties   I have made the following changes to my diet since my last visit: Eating healthoer and being more active   With regards to my diet, I am still struggling with: Eating chocolate   I have made the following changes to my activity/exercise since my last visit: More active durimg the day at work   With regards to my activity/exercise, I am still struggling with: Trying to get away from my work to excercise             MEDICATIONS:   Current Outpatient Medications   Medication Sig Dispense Refill    Semaglutide-Weight Management (WEGOVY) 1.7 MG/0.75ML pen Inject 1.7 mg subcutaneously once a week. 3 mL 3    FERROUS FUMARATE-VITAMIN C PO Take 29 mg by mouth daily      fish oil-omega-3 fatty acids 1000 MG  "capsule Take 1,000 mg by mouth daily      lisinopril (ZESTRIL) 20 MG tablet Take 20 mg by mouth daily      Multiple Vitamins-Minerals (OCUVITE-LUTEIN PO) Take 1 tablet by mouth 2 times daily      Thiamine Mononitrate (VITAMIN B-1) 100 MG TABS Take 100 mg by mouth daily      vitamin B-12 (CYANOCOBALAMIN) 100 MCG tablet Take 100 mcg by mouth daily             10/15/2024     4:48 PM   Weight Loss Medication History Reviewed With Patient   Which weight loss medications are you currently taking on a regular basis? Wegovy   Are you having any side effects from the weight loss medication that we have prescribed you? No                No data to display                  PHYSICAL EXAM:  Objective    Ht 1.651 m (5' 5\")   Wt 85.3 kg (188 lb)   BMI 31.28 kg/m      Vitals - Patient Reported  Pain Score: No Pain (0)      Vitals:  No vitals were obtained today due to virtual visit.    GENERAL: alert and no distress  EYES: Eyes grossly normal to inspection.  No discharge or erythema, or obvious scleral/conjunctival abnormalities.  RESP: No audible wheeze, cough, or visible cyanosis.    SKIN: Visible skin clear. No significant rash, abnormal pigmentation or lesions.  NEURO: Cranial nerves grossly intact.  Mentation and speech appropriate for age.  PSYCH: Appropriate affect, tone, and pace of words        Sincerely,    Adilene Doran PA-C      16 minutes spent by me on the date of the encounter doing chart review, history and exam, documentation and further activities per the note    The longitudinal plan of care for the diagnosis(es)/condition(s) as documented were addressed during this visit. Due to the added complexity in care, I will continue to support Becky  in the subsequent management and with ongoing continuity of care.   "

## 2024-10-16 NOTE — LETTER
10/16/2024       RE: Becky Barrios  [address]  [facility] [address]     Dear Colleague,    Thank you for referring your patient, Becky Barrios, to the [facility]NAG[facility] [place] at [facility]. Please see a copy of my visit note below.    Virtual Visit Details    Type of service:  Video Visit     Originating Location (pt. Location): Home    Distant Location (provider location):  Off-site  Platform used for Video Visit: [facility] Medical Weight Management Note     Becky Barrios  MRN:  [id number]  :  1980  MAGED:  10/16/2024    Dear Alba Sharma NP,    I had the pleasure of seeing your patient Becky Barrios. She is a 44 year old female who I am continuing to see for treatment of obesity related to:        2024     1:30 PM   --   I have the following health issues associated with obesity Pre-Diabetes    High Blood Pressure    Fatty Liver   I have the following symptoms associated with obesity Back Pain    Fatigue    Hip Pain       Assessment & Plan  Problem List Items Addressed This Visit       Class 2 severe obesity with serious comorbidity and body mass index (BMI) of 35.0 to 35.9 in adult, unspecified obesity type (H)    Relevant Medications    Semaglutide-Weight Management (WEGOVY) 1.7 MG/0.75ML pen    History of Pacheco-en-Y gastric bypass    Relevant Medications    Semaglutide-Weight Management (WEGOVY) 1.7 MG/0.75ML pen      Plan  Continue wegovy 1.7mg   Goals we discussed today:   90g protein daily   Utilizing fitness center during the winter   Labs completed 2024- will have [facility] these over   Follow up with Adilene in 4 months   Dietician appointment not needed- has access to RD through primary care clinic   Keep up the excellent work!       INTERVAL HISTORY:  New MWM with me 24  Overweight onset in high school, but saw more significant weight gain  in college. Age 16 weighed approximately 155lbs. Throughout college gained  up to 200, continued to gain up to 255, which was highest weight in life.      Was diagnosed with type 2 diabetes in 2008, but blood sugar has not reached diabetes levels since her RYGB.   Had a RYGB in 2009 through [facility].  Ihsan weight after RYGB was 155 lbs. Has gradually regained this weight over the last 15 years, up to current weight of 215lbs which is her highest weight since surgery.      Regarding any issues post RYGB: Experiences loose stools after eating high carbohydrate meals since surgery, sometimes stools are loose even if the meal wasn't high in carbs or sugar.   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 She ate a piece of candy and felt better afterwards. Has experienced other episodes dizziness and fatigue after not eating for long stretches of time, has not checked her blood sugar, describes that none were worrisome to her aside from that one episode as an ED nurse.   Had one episode of severe abdominal pain after eating a very large amount of food 7-8 years ago that lead to a brief hospitalization and management through [facility] due to concern for a bowel obstruction , was monitored over night with close monitoring and was eventually able to go home without surgical intervention, the presumed diagnosis was related to food impaction. Aside from these aforementioned issues, has not seen any other issues related to the RYGB (denies dysphagia, nausea, abdominal pain).  Labs and post bariatric vitamins managed annually through PCP Alba Sharma in [place].      Aside from RYGB, at times has been able to lose 10lbs in the past with increased exercise but this weight will come back once exercise stopped, Crystal struggles to exercise lately due to frequent ankle pain.        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      Comorbidities associated with weight gain include high blood pressure, fatty liver, GERD (occasionally, managed as needed with tums), back pain, ankle pain, history of elevated cholesterol, history of type 2 diabetes diagnosis.      Motivators for weight loss include improve overall health, be able to live as long as possible to be around for her kids.      She is interested in starting a medication as a tool for working towards sustainable weight loss.     Regarding eating patterns and diet,  she typically eats 2 meals a day. Craves pasta and bread, experiences food noise throughout the day. Is able to get full. Can stay full. Eats out/ gets take out a few times a week. Drinks diet energy drink, coffee with splenda and cream, diet coke once a week.    First meal, 10:30 or 11:00 - will eat leftovers from supper, doughnuts in the office. Sometimes will eat a cheese stick. Typically eats alone, doesn't like people watching her eat.   Second meal around 5:00 - supper. Hamburger, chicken, fish, or steak; mashed potato, baked potatos, pasta; vegetables   After supper might have a bite of food but otherwise doesn't typically eat  Seldomly wakes up in the middle of the night to eat (maybe once every few months).      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 Has frequent ankle pain (foot surgery 20 years ago related to this), makes it harder to walk. Likes walking generally, hopes to take walk breaks at work as her work encourages her to do this.         Last seen by me 4/30/24:   Started zepbound, switched to wegovy due to supply.   Has not completed RYGB  post op labs    Started wegovy     -titrated up to 1.7mg     Today in visit 10/16/24   23lbs weight loss since last visit   Feeling good on wegovy   Has gotten post op labs done through [facility]     Ever since RYGB- experiencing fecal urgency after breakfast, this seems to happen to her no matter what she eats (regardless of carb content), will have to run to the bathroom- experiences loose stool or diarrhea.  Discussed keeping a food journal to confirm whether there are any specific foods triggering this.       Wt Readings from Last 5 Encounters:   10/16/24 85.3 kg (188 lb)   04/30/24 95.9 kg (211 lb 8 oz)   02/07/24 97.5 kg (215 lb)       Anti-obesity medication history    Current:   Wegovy 1.7mg- helpful with reducing portions, getting full faster. A few weeks into this dose, tolerating.     GERD symptoms: denies     Constipation/Diarrhea: denies     Recent diet changes: no major changes.     Protein- continuing to prioritize before other meals- resources sent via [employer]     Recent exercise/activity changes: going on lots of walks- has goal to increase strength training. Has a nearby fitness center, has access to it. Discussed building that into her week     Vitamins/Labs:  post op labs ordered at new [facility] visit, Becky completed them at [facility]. Will request these labs to be faxed back over. Currently taking b12, thiamine, calcium, vitamin d, iron supplement, fish oil, vitamin c.       CURRENT WEIGHT:   188 lbs 0 oz                      10/15/2024     4:48 PM   Changes and Difficulties   I have made the following changes to my diet since my last visit: Eating healthoer and being more active   With regards to my diet, I am still struggling with: Eating chocolate   I have made the following changes to my activity/exercise since my last visit: More active durimg the day at work   With regards to my activity/exercise, I am still struggling with: Trying to get away from  "my work to excercise             MEDICATIONS:   Current Outpatient Medications   Medication Sig Dispense Refill     Semaglutide-Weight Management (WEGOVY) 1.7 MG/0.75ML pen Inject 1.7 mg subcutaneously once a week. 3 mL 3     FERROUS FUMARATE-VITAMIN C PO Take 29 mg by mouth daily       fish oil-omega-3 fatty acids 1000 MG capsule Take 1,000 mg by mouth daily       lisinopril (ZESTRIL) 20 MG tablet Take 20 mg by mouth daily       Multiple Vitamins-Minerals (OCUVITE-LUTEIN PO) Take 1 tablet by mouth 2 times daily       Thiamine Mononitrate (VITAMIN B-1) 100 MG TABS Take 100 mg by mouth daily       vitamin B-12 (CYANOCOBALAMIN) 100 MCG tablet Take 100 mcg by mouth daily             10/15/2024     4:48 PM   Weight Loss Medication History Reviewed With Patient   Which weight loss medications are you currently taking on a regular basis? Wegovy   Are you having any side effects from the weight loss medication that we have prescribed you? No                No data to display                  PHYSICAL EXAM:  Objective   Ht 1.651 m (5' 5\")   Wt 85.3 kg (188 lb)   BMI 31.28 kg/m      Vitals - Patient Reported  Pain Score: No Pain (0)      Vitals:  No vitals were obtained today due to virtual visit.    GENERAL: alert and no distress  EYES: Eyes grossly normal to inspection.  No discharge or erythema, or obvious scleral/conjunctival abnormalities.  RESP: No audible wheeze, cough, or visible cyanosis.    SKIN: Visible skin clear. No significant rash, abnormal pigmentation or lesions.  NEURO: Cranial nerves grossly intact.  Mentation and speech appropriate for age.  PSYCH: Appropriate affect, tone, and pace of words        Sincerely,    Adilene Doran PA-C      16 minutes spent by me on the date of the encounter doing chart review, history and exam, documentation and further activities per the note    The longitudinal plan of care for the diagnosis(es)/condition(s) as documented were addressed during this visit. Due to the " added complexity in care, I will continue to support Crystal  in the subsequent management and with ongoing continuity of care.       Again, thank you for allowing me to participate in the care of your patient.      Sincerely,    Adilene Doran PA-C

## 2024-10-16 NOTE — NURSING NOTE
Is the patient currently in the state of [place]? YES    Location: work    Visit mode:VIDEO    If the visit is dropped, the patient can be reconnected by: VIDEO VISIT: Text to cell phone:   Telephone Information:   Mobile [phone]    and VIDEO VISIT: Send to e-mail at: [email or web address]    Will anyone else be joining the visit? NO  (If patient encounters technical issues they should call [phone]    How would you like to obtain your AVS? MyChart    Are changes needed to the allergy or medication list? No    Are refills needed on medications prescribed by this physician? NO    Reason for visit: DIANA Zapata VVCHAS    QNR Status: complete

## 2024-10-16 NOTE — PATIENT INSTRUCTIONS
"Thank you for allowing us the privilege of caring for you. We hope we provided you with the excellent service you deserve.   Please let us know if there is anything else we can do for you so that we can be sure you are completely satisfied with your care experience.    To ensure the quality of our services you may be receiving a patient satisfaction survey from an independent patient satisfaction monitoring company.    The greatest compliment you can give is a \"Likely to Recommend\"    Your visit was with Adilene Doran PA-C today.    Instructions per today's visit:     Colt Barrios, it was great to visit with you today.  Here is a review of our visit.  If our clinic scheduler is not able to reach you please call [phone] to schedule your next appointments.    Plan  Continue wegovy 1.7mg   Goals we discussed today:   90g protein daily   Utilizing fitness center during the winter   Labs completed February 2024- will have [facility] fax these over   Follow up with Adilene in 4 months   Dietician appointment not needed- has access to RD through primary care clinic   Keep up the excellent work!       Information about Video Visits with [facility] [employer]: video visit information  _________________________________________________________________________________________________________________________________________________________  If you are asked by your clinic team to have your blood pressure checked:  [facility] do offer several locations for blood pressure checks. Please follow the below link to schedule an appointment. Scheduling an appointment at the pharmacy for a blood pressure check is now preferred.    Appointment Plus ([email or web address])  _________________________________________________________________________________________________________________________________________________________  Important contact and scheduling information:  Please call our contact " [place] at [phone] to schedule your next appointments.  To find a lab location near you, please call [phone].  For any nursing questions or concerns call Sofia Escobar LPN at [phone] or Jenni Richter RN at [phone]  Please call during clinic hours Monday through Friday 8:00a - 4:00p if you have questions or you can contact us via [employer]hart at anytime and we will reply during clinic hours.    Lab results will be communicated through My Chart or letter (if My Chart not used). Please call the clinic if you have not received communication after 1 week or if you have any questions.?  Clinic Fax: [phone]    _________________________________________________________________________________________________________________________________________________________  Meal Replacement Products:    Here is the link to our new e-store where you can purchase our meal replacement products    [facility] E-Store  [email or web address]    The one week starter kit is a great way to sample a variety of products and see what works for you.    If you want more information about the product go to: [facility] Meals  [email or web address]    If you are an employee or [facility] or [facility] please contact your care team for a 10% estore discount    Free Shipping for orders over $75     Benefits of meal replacements products:    Portion and calorie control  Improved nutrition  Structured eating  Simplified food choices  Avoid contact with trigger foods  _________________________________________________________________________________________________________________________________________________________  Interested in working with a health ?  Health coaches work with you to improve your overall health and wellbeing.  They look at the whole person, and may involve discussion of different areas of life, including, but not limited to the four pillars of health (sleep, exercise,  nutrition, and stress management). Discuss with your care team if you would like to start working a health .  Health Coaching-3 Pack: Schedule by calling [phone]    $99 for three health coaching visits    Visits may be done in person or via phone    Coaching is a partnership between the  and the client; Coaches do not prescribe or diagnose    Coaching helps inspire the client to reach his/her personal goals   _________________________________________________________________________________________________________________________________________________________  24 Week Healthy Lifestyle Plan:    Our mission in the 24-week Healthy Lifestyle Plan is to provide you with individualized care by giving you the tools, education and support you need to lose weight and maintain a healthy lifestyle. In your 24-week journey, you ll be supported by a dedicated weight loss team that includes registered dietitians, medical weight management providers, health coaches, and nurses -- all with special expertise in weight loss -- to help you every step of the way.     Monthly meetings with your registered dietician or medical weight management provider help to review your progress, update your care plan, and make any adjustments needed to ensure success. Between these visits, weekly and bi-weekly health  visits will help you focus on the four pillars of weight loss -- stress, sleep, nutrition, and exercise -- and how you can best adapt each to achieve sustainable weight loss results.    In addition, you will be given exclusive access to online wellbeing classes through [employer].  Your initial visit will be with a medical weight management provider who will help to understand your weight loss goals and ensure this program is the right fit for you. Please let our team know if you are interested in the 24 week plan by sending a message to your care team or calling [phone] to  schedule.  _________________________________________________________________________________________________________________________________________________________  __________  [facility] Get Moving Program  Our team of physical therapists is trained to help you understand and take control of your condition. They will perform a thorough evaluation to determine your ability for activity and develop a customized plan to fit your goals and physical ability.  Scheduling: Unsure if the Get Moving program is right for you? Discuss the program with your medical provider or diabetes educator. You can also call us at [phone] to ask questions or schedule an appointment.   [facility] Get Moving Program  ____________________________________________________________________________________________________________________________________________________________________________   [facility] Diabetes Prevention Program (DPP)  If you have prediabetes and Medicare please contact us via [employer] to learn more about the Diabetes Prevention Program (DPP)  Program Details:   [facility] offers the year-long Diabetes Prevention Program (DPP). The program helps you to make lifestyle changes that prevent or delay type 2 diabetes by supporting healthy eating, increased physical activity, stress reduction and use of coping skills.   On average, previous [facility] DPP cohorts have lost and maintained at least 5% of their starting weight throughout the program and averaged more than 150 minutes of physical activity per week.  Participants meet weekly for one-hour group sessions over sixteen weeks, every other week for the next 8 weeks, and monthly for the last six months.   A year-long maintenance program is also available for participants who complete the first year.   Location & Cost:   During the COVID-19 Public Health Emergency, the program is offered virtually. When in-person classes can resume, they  will be held at [facility].  For people with Medicare, the program is covered in full. A self-pay option will also be available for those with non-Medicare insurance plans.   ______________________________________________________________________________________________________________________________________________________________________________________________________________________________    To work with a Behavioral Health Psychologist:    Call to schedule:    Tapan Moya - [phone]  Nolan Obrien - [phone]  Donna Garrison - [phone]  Trish Yost - [phone]   Kristi Hein PhD (cannot accept Medicare) [phone]        Thank you,   [facility] Comprehensive Weight Management Team

## 2024-10-16 NOTE — TELEPHONE ENCOUNTER
PA Initiation    Medication: WEGOVY 1.7 MG/0.75ML SC SOAJ  Insurance Company: [employer] - Phone [phone] Fax [phone]  Pharmacy Filling the Rx: [facility] - [place], [place] - [address]  Filling Pharmacy Phone:    Filling Pharmacy Fax:    Start Date: 10/16/2024    [id number]

## 2024-10-17 ENCOUNTER — TELEPHONE (OUTPATIENT)
Dept: ENDOCRINOLOGY | Facility: CLINIC | Age: 44
End: 2024-10-17
Payer: COMMERCIAL

## 2024-10-17 NOTE — TELEPHONE ENCOUNTER
Prior Authorization Approval    Medication: WEGOVY 1.7 MG/0.75ML SC SOAJ  Authorization Effective Date: 9/16/2024  Authorization Expiration Date: 10/16/2025  Approved Dose/Quantity: 4 pens per 28 days  Reference #: [id number]   Insurance Company: [employer] - Phone [phone] Fax [phone]  Expected CoPay: $ 60  CoPay Card Available: Yes    Financial Assistance Needed: Enrolled  Which Pharmacy is filling the prescription: [facility] - [place], [place] - [address]  Pharmacy Notified: Released Rx  Patient Notified: Not needed

## 2024-10-29 ENCOUNTER — MYC REFILL (OUTPATIENT)
Dept: ENDOCRINOLOGY | Facility: CLINIC | Age: 44
End: 2024-10-29
Payer: COMMERCIAL

## 2024-10-29 DIAGNOSIS — E66.812 CLASS 2 SEVERE OBESITY WITH SERIOUS COMORBIDITY AND BODY MASS INDEX (BMI) OF 35.0 TO 35.9 IN ADULT, UNSPECIFIED OBESITY TYPE (H): ICD-10-CM

## 2024-10-29 DIAGNOSIS — Z98.84 HISTORY OF ROUX-EN-Y GASTRIC BYPASS: ICD-10-CM

## 2024-10-29 DIAGNOSIS — E66.01 CLASS 2 SEVERE OBESITY WITH SERIOUS COMORBIDITY AND BODY MASS INDEX (BMI) OF 35.0 TO 35.9 IN ADULT, UNSPECIFIED OBESITY TYPE (H): ICD-10-CM

## 2024-10-29 RX ORDER — SEMAGLUTIDE 1.7 MG/.75ML
1.7 INJECTION, SOLUTION SUBCUTANEOUS WEEKLY
Qty: 3 ML | Refills: 3 | Status: SHIPPED | OUTPATIENT
Start: 2024-10-29

## 2025-01-16 ENCOUNTER — MYC MEDICAL ADVICE (OUTPATIENT)
Dept: ENDOCRINOLOGY | Facility: CLINIC | Age: 45
End: 2025-01-16
Payer: COMMERCIAL

## 2025-01-16 DIAGNOSIS — Z98.84 HISTORY OF ROUX-EN-Y GASTRIC BYPASS: ICD-10-CM

## 2025-01-16 DIAGNOSIS — E66.812 CLASS 2 SEVERE OBESITY WITH SERIOUS COMORBIDITY AND BODY MASS INDEX (BMI) OF 35.0 TO 35.9 IN ADULT, UNSPECIFIED OBESITY TYPE (H): ICD-10-CM

## 2025-01-16 DIAGNOSIS — E66.01 CLASS 2 SEVERE OBESITY WITH SERIOUS COMORBIDITY AND BODY MASS INDEX (BMI) OF 35.0 TO 35.9 IN ADULT, UNSPECIFIED OBESITY TYPE (H): ICD-10-CM

## 2025-01-17 ENCOUNTER — MYC MEDICAL ADVICE (OUTPATIENT)
Dept: ENDOCRINOLOGY | Facility: CLINIC | Age: 45
End: 2025-01-17
Payer: COMMERCIAL

## 2025-01-17 NOTE — TELEPHONE ENCOUNTER
Looking at the claim looks like Primary:    Then submitted to Secondary:       I then called insurance and was told:This had changed to a Tier 3 medication this year and pt is responsible for 40% of copay. Will not change with deductible. Let me know if you would like me to look at anything else.

## 2025-01-22 DIAGNOSIS — E66.01 CLASS 2 SEVERE OBESITY WITH SERIOUS COMORBIDITY AND BODY MASS INDEX (BMI) OF 35.0 TO 35.9 IN ADULT, UNSPECIFIED OBESITY TYPE (H): ICD-10-CM

## 2025-01-22 DIAGNOSIS — E66.812 CLASS 2 SEVERE OBESITY WITH SERIOUS COMORBIDITY AND BODY MASS INDEX (BMI) OF 35.0 TO 35.9 IN ADULT, UNSPECIFIED OBESITY TYPE (H): ICD-10-CM

## 2025-01-22 DIAGNOSIS — Z98.84 HISTORY OF ROUX-EN-Y GASTRIC BYPASS: ICD-10-CM

## 2025-01-30 RX ORDER — SEMAGLUTIDE 1.7 MG/.75ML
1.7 INJECTION, SOLUTION SUBCUTANEOUS WEEKLY
Qty: 3 ML | Refills: 3 | Status: SHIPPED | OUTPATIENT
Start: 2025-01-30

## 2025-03-11 ENCOUNTER — MYC MEDICAL ADVICE (OUTPATIENT)
Dept: ENDOCRINOLOGY | Facility: CLINIC | Age: 45
End: 2025-03-11
Payer: COMMERCIAL

## 2025-03-11 ENCOUNTER — TELEPHONE (OUTPATIENT)
Dept: ENDOCRINOLOGY | Facility: CLINIC | Age: 45
End: 2025-03-11
Payer: COMMERCIAL

## 2025-03-11 NOTE — TELEPHONE ENCOUNTER
Left Voicemail (1st Attempt) and Sent Mychart (1st Attempt) for the patient to call back and reschedule the following:    Appointment type: Return Weight Management  Appointment mode: Virtual Visit  Provider(s): Felecia Ledesma NP, Ethel Villegas PA-C, or Eloina Banegas PA-C  Date: 3/17/25  Specialty phone number: [phone]    Additonal Notes: OK to use a 30 minute New Roberto Carlos slot per Shae; please note this in the Appt Notes

## 2025-03-13 NOTE — TELEPHONE ENCOUNTER
You have an appointment currently scheduled with Adilene Doran on 3/19.  This appt is cancelled.  Due to changes to the providers schedule we need to reschedule your appointment.      Please use your MyChart or call [phone] to reschedule this appointment at your earliest convenience.    We apologize for any inconvenience this may cause.    We look forward to seeing you at your upcoming appointment and thank you for choosing [facility].    Thank you for trusting us with your care.    Sincerely, [facility]

## 2025-03-16 ENCOUNTER — HEALTH MAINTENANCE LETTER (OUTPATIENT)
Age: 45
End: 2025-03-16